# Patient Record
Sex: MALE | Race: WHITE | NOT HISPANIC OR LATINO | Employment: FULL TIME | URBAN - METROPOLITAN AREA
[De-identification: names, ages, dates, MRNs, and addresses within clinical notes are randomized per-mention and may not be internally consistent; named-entity substitution may affect disease eponyms.]

---

## 2017-06-26 ENCOUNTER — HOSPITAL ENCOUNTER (EMERGENCY)
Facility: HOSPITAL | Age: 42
Discharge: HOME | End: 2017-06-26
Attending: EMERGENCY MEDICINE | Admitting: EMERGENCY MEDICINE

## 2017-06-26 ENCOUNTER — APPOINTMENT (OUTPATIENT)
Dept: GENERAL RADIOLOGY | Facility: HOSPITAL | Age: 42
End: 2017-06-26

## 2017-06-26 VITALS
RESPIRATION RATE: 18 BRPM | HEART RATE: 90 BPM | WEIGHT: 215 LBS | SYSTOLIC BLOOD PRESSURE: 109 MMHG | OXYGEN SATURATION: 100 % | HEIGHT: 72 IN | TEMPERATURE: 98.4 F | BODY MASS INDEX: 29.12 KG/M2 | DIASTOLIC BLOOD PRESSURE: 83 MMHG

## 2017-06-26 DIAGNOSIS — S82.892A CLOSED LEFT ANKLE FRACTURE, INITIAL ENCOUNTER: Primary | ICD-10-CM

## 2017-06-26 PROCEDURE — 99284 EMERGENCY DEPT VISIT MOD MDM: CPT | Performed by: EMERGENCY MEDICINE

## 2017-06-26 PROCEDURE — 73610 X-RAY EXAM OF ANKLE: CPT

## 2017-06-26 PROCEDURE — 99283 EMERGENCY DEPT VISIT LOW MDM: CPT

## 2017-06-26 RX ORDER — IBUPROFEN 800 MG/1
TABLET ORAL
Qty: 30 TABLET | Refills: 0 | Status: SHIPPED | OUTPATIENT
Start: 2017-06-26 | End: 2017-08-10 | Stop reason: SDUPTHER

## 2017-06-26 RX ORDER — CITALOPRAM 10 MG/1
10 TABLET ORAL DAILY
COMMUNITY
End: 2017-10-12

## 2017-06-26 RX ORDER — HYDROCODONE BITARTRATE AND ACETAMINOPHEN 5; 325 MG/1; MG/1
TABLET ORAL
Qty: 20 TABLET | Refills: 0 | Status: SHIPPED | OUTPATIENT
Start: 2017-06-26 | End: 2017-08-10

## 2017-06-27 NOTE — DISCHARGE INSTRUCTIONS
No weightbearing on left foot until released by orthopedics    Return to the emergency department with worsening symptoms, uncontrolled pain, inability to tolerate oral liquids, fever greater than 105°F not controlled by Tylenol and ibuprofen or as needed with emergent concerns.

## 2017-06-27 NOTE — ED PROVIDER NOTES
Subjective   History of Present Illness     History of Present Illness    Chief complaint: Left ankle pain    Location: Just posterior to the medial malleolus    Quality/Severity:  Mild-to-moderate    Timing/Duration: Abrupt onset shortly prior to arrival    Modifying Factors: Worse with ambulation and range of motion    Associated Symptoms: No numbness or weakness    Narrative: 42-year-old male was at karate when he landed awkwardly and felt sudden discomfort in his left ankle.  He reports hearing a pop.  He had severe pain initially which is slightly improved.  There is some discomfort with walking.  No ecchymosis, or numbness noted.    Review of Systems  All other systems reviewed and are otherwise negative  History reviewed. No pertinent past medical history.    Allergies   Allergen Reactions   • Tessalon [Benzonatate] Anaphylaxis       Past Surgical History:   Procedure Laterality Date   • APPENDECTOMY     • CHOLECYSTECTOMY         History reviewed. No pertinent family history.    Social History     Social History   • Marital status:      Spouse name: N/A   • Number of children: N/A   • Years of education: N/A     Social History Main Topics   • Smoking status: Never Smoker   • Smokeless tobacco: None   • Alcohol use No   • Drug use: None   • Sexual activity: Not Asked     Other Topics Concern   • None     Social History Narrative   • None       ED Triage Vitals   Temp Heart Rate Resp BP SpO2   06/26/17 2149 06/26/17 2149 06/26/17 2149 06/26/17 2149 06/26/17 2149   98.4 °F (36.9 °C) 90 18 109/83 100 %      Temp src Heart Rate Source Patient Position BP Location FiO2 (%)   06/26/17 2149 -- 06/26/17 2149 06/26/17 2149 --   Oral  Sitting Right arm          Objective   Physical Exam   Constitutional: He is oriented to person, place, and time. He appears well-developed. No distress.   Cardiovascular: Normal rate and intact distal pulses.    Musculoskeletal:        Legs:       Neurological: He is alert and  oriented to person, place, and time.   Skin: Skin is warm and dry.   Psychiatric: He has a normal mood and affect. Thought content normal.   Vitals reviewed.      Procedures         ED Course  ED Course   Comment By Time   Patient was offered pain medication in the ED but declined.  Outpatient follow-up recommended.  Patient is agreeable. Alexi Nino MD 06/26 2209        RADIOLOGY        Study: Left ankle-3 views    Findings: Nondisplaced fracture distal tibia posterior element    Interpreted contemporaneously with treatment by me, independently viewed by me      SPLINT    Location: Right foot/ankle  Type: Orthopedic boot  Applied by tech  Following splinting, I have reexamined the extremity and noted no significant neurovascular change.            MDM  Saleem query complete. Treatment plan to include limited course of prescribed controlled substance.  Risks including addiction, tolerance, sedation, benefits and alternatives presented to patient.  Final diagnoses:   Closed left ankle fracture, initial encounter              Medication List      New Prescriptions          HYDROcodone-acetaminophen 5-325 MG per tablet   Commonly known as:  NORCO   Take 1-2 tabs by mouth every 4-6 hours as needed for pain       ibuprofen 800 MG tablet   Commonly known as:  ADVIL,MOTRIN   Take one tablet by mouth every 8 hours as needed for pain           Follow-up Information     Schedule an appointment as soon as possible for a visit with Zaid Perkins MD.    Specialties:  Orthopedic Surgery, Sports Medicine    Why:  Right ankle fracture    Contact information:    [address] [id number] [phone]                 Alexi Nino MD  06/26/17 2206

## 2017-07-05 ENCOUNTER — OFFICE VISIT (OUTPATIENT)
Dept: ORTHOPEDIC SURGERY | Facility: CLINIC | Age: 42
End: 2017-07-05

## 2017-07-05 VITALS
DIASTOLIC BLOOD PRESSURE: 83 MMHG | HEART RATE: 85 BPM | SYSTOLIC BLOOD PRESSURE: 123 MMHG | BODY MASS INDEX: 29.8 KG/M2 | WEIGHT: 220 LBS | HEIGHT: 72 IN

## 2017-07-05 DIAGNOSIS — R52 PAIN: Primary | ICD-10-CM

## 2017-07-05 DIAGNOSIS — S82.55XA CLOSED NONDISPLACED FRACTURE OF MEDIAL MALLEOLUS OF LEFT TIBIA, INITIAL ENCOUNTER: ICD-10-CM

## 2017-07-05 PROCEDURE — 73610 X-RAY EXAM OF ANKLE: CPT | Performed by: ORTHOPAEDIC SURGERY

## 2017-07-05 PROCEDURE — 99203 OFFICE O/P NEW LOW 30 MIN: CPT | Performed by: ORTHOPAEDIC SURGERY

## 2017-07-05 NOTE — PROGRESS NOTES
Subjective: Left ankle pain     Patient ID: Manuel Bellamy is a 42 y.o. male.    Chief Complaint:    History of Present Illness 42-year-old male was injured back in June 26 in a karate class or participating an exercise with not to the ground but his left ankle was  hyperflexed , developing immediate pain and discomfort.  Was seen in the ER Gallo Armendariz her x-rays demonstrated a nondisplaced posterior malleolar fracture.  He is placed in a postop boot and presents here.       Social History     Occupational History   • Not on file.     Social History Main Topics   • Smoking status: Never Smoker   • Smokeless tobacco: Not on file   • Alcohol use No   • Drug use: Not on file   • Sexual activity: Not on file      Review of Systems   Constitutional: Negative for chills, diaphoresis, fever and unexpected weight change.   HENT: Negative for hearing loss, nosebleeds, sore throat and tinnitus.    Eyes: Negative for pain and visual disturbance.   Respiratory: Negative for cough, shortness of breath and wheezing.    Cardiovascular: Negative for chest pain and palpitations.   Gastrointestinal: Negative for abdominal pain, diarrhea, nausea and vomiting.   Endocrine: Negative for cold intolerance, heat intolerance and polydipsia.   Genitourinary: Negative for difficulty urinating, dysuria and hematuria.   Musculoskeletal: Positive for arthralgias. Negative for joint swelling and myalgias.   Skin: Negative for rash and wound.   Allergic/Immunologic: Negative for environmental allergies.   Neurological: Negative for dizziness, syncope and numbness.   Hematological: Does not bruise/bleed easily.   Psychiatric/Behavioral: Negative for dysphoric mood and sleep disturbance. The patient is not nervous/anxious.    All other systems reviewed and are negative.        No past medical history on file.  Past Surgical History:   Procedure Laterality Date   • APPENDECTOMY     • CHOLECYSTECTOMY       No family history on  file.      Objective:  Vitals:    07/05/17 1104   BP: 123/83   Pulse: 85     Last 3 weights    07/05/17  1104   Weight: 220 lb (99.8 kg)     Body mass index is 29.84 kg/(m^2).       Ortho Exam  I reviewed the x-rays done at [facility] and repeat AP lateral oblique views here all which show the nondisplaced posterior malleolar fracture.  On exam is alert and oriented ×3.  Head ears eyes nose and throat are normocephalic.  Pupils equal round reactive to light sclerae clear.  Left leg shows no motor deficit good distal pulses.  No sensory loss.  The skin is cool to touch.  His calf is nontender negative Homans.  There is tenderness to the ankle both medially and laterally but there is minimal swelling no erythema.  He has been ambulating nonweightbearing with the crutches.  He has taken Motrin in the past with no GI side effects but not currently taking anything at this time.  Quad function of that left leg is 5 over 5.  Lower leg muscle testing is weak only secondary to pain.  He can dorsiflex to neutral position past which it is uncomfortable.  His Achilles tendon is intact to palpation.    Assessment:       1. Pain    2. Closed nondisplaced fracture of medial malleolus of left tibia, initial encounter          Plan:      I reviewed the x-rays both at the hospital here with the patient.  We'll keep him in his postop fracture boot.  He is to remain strictly nonweightbearing but he can remove it for range of motion exercises.  I instructed to restart his mobility previously took for another problem.  Return to see me in 3 weeks with an x-ray of his ankle.  Off work until July 12 in returning to work for the patient requests.      Work Status:    SANGITA query complete.    Orders:  Orders Placed This Encounter   Procedures   • XR Ankle 3+ View Left       Medications:  No orders of the defined types were placed in this encounter.      Followup:  Return in about 3 weeks (around 7/26/2017).          Dragon  transcription disclaimer     Much of this encounter note is an electronic transcription/translation of spoken language to printed text. The electronic translation of spoken language may permit erroneous, or at times, nonsensical words or phrases to be inadvertently transcribed. Although I have reviewed the note for such errors, some may still exist.

## 2017-07-20 ENCOUNTER — OFFICE VISIT (OUTPATIENT)
Dept: ORTHOPEDIC SURGERY | Facility: CLINIC | Age: 42
End: 2017-07-20

## 2017-07-20 DIAGNOSIS — S82.55XA CLOSED NONDISPLACED FRACTURE OF MEDIAL MALLEOLUS OF LEFT TIBIA, INITIAL ENCOUNTER: ICD-10-CM

## 2017-07-20 DIAGNOSIS — R52 PAIN: Primary | ICD-10-CM

## 2017-07-20 PROCEDURE — 73610 X-RAY EXAM OF ANKLE: CPT | Performed by: ORTHOPAEDIC SURGERY

## 2017-07-20 PROCEDURE — 99024 POSTOP FOLLOW-UP VISIT: CPT | Performed by: ORTHOPAEDIC SURGERY

## 2017-07-20 RX ORDER — ASPIRIN 81 MG/1
81 TABLET ORAL DAILY
COMMUNITY
End: 2018-05-18

## 2017-07-20 NOTE — PROGRESS NOTES
Subjective: Left ankle pain     Patient ID: Manuel Bellamy is a 42 y.o. male.    Chief Complaint:    History of Present Illness 42-year-old male is 4 weeks out from a nondisplaced posterior malleolar fracture of the left ankle.  He has been nonweightbearing expensive no pain in the ankle.  There is some soreness in his midfoot.       Social History     Occupational History   • Not on file.     Social History Main Topics   • Smoking status: Never Smoker   • Smokeless tobacco: Not on file   • Alcohol use No   • Drug use: Not on file   • Sexual activity: Not on file      Review of Systems   Constitutional: Negative for chills, diaphoresis, fever and unexpected weight change.   HENT: Negative for hearing loss, nosebleeds, sore throat and tinnitus.    Eyes: Negative for pain and visual disturbance.   Respiratory: Negative for cough, shortness of breath and wheezing.    Cardiovascular: Negative for chest pain and palpitations.   Gastrointestinal: Negative for abdominal pain, diarrhea, nausea and vomiting.   Endocrine: Negative for cold intolerance, heat intolerance and polydipsia.   Genitourinary: Negative for difficulty urinating, dysuria and hematuria.   Musculoskeletal: Negative for arthralgias, joint swelling and myalgias.   Skin: Negative for rash and wound.   Allergic/Immunologic: Negative for environmental allergies.   Neurological: Negative for dizziness, syncope and numbness.   Hematological: Does not bruise/bleed easily.   Psychiatric/Behavioral: Negative for dysphoric mood and sleep disturbance. The patient is not nervous/anxious.          No past medical history on file.  Past Surgical History:   Procedure Laterality Date   • APPENDECTOMY     • CHOLECYSTECTOMY       No family history on file.      Objective:  There were no vitals filed for this visit.  There were no vitals filed for this visit.  There is no height or weight on file to calculate BMI.       Ortho Exam  AP lateral oblique view of the ankle and  foot shows a nondisplaced posterior malleolar fragment.  The fragment involves less than 20% of the tibia and is in the nonweightbearing portion of the tibial talar joint.  X-rayed the foot is negative for any fracture.  Compared to previous x-rays no change in the position of the posterior malleolar fragment.  On exam is alert and oriented ×3.  This minimal swelling noted.  No sensory loss.  Skin is cool to touch.  No motor deficit good distal pulses.  He can plantarflex the foot against resistance with no pain.  Mild tenderness to palpation over the metatarsal bases but there is no crepitus.  No ecchymosis.    Assessment:       1. Pain    2. Closed nondisplaced fracture of medial malleolus of left tibia, initial encounter          Plan:     reviewed the x-ray findings with the patient and the physical findings.  I believe that midfoot tenderness is ligament for soft tissue injury that occurred at the time he hurt the posterior malleolus.  He can begin though weightbearing as tolerated and that was emphasized as tolerated in the fracture boot.  Return to see me in 3 weeks with an x-ray of the ankle.  Continue the baby aspirin a daily basis till seen.  Continue sitdown work only until seen.      Work Status:    SANGITA query complete.    Orders:  Orders Placed This Encounter   Procedures   • XR Ankle 3+ View Left       Medications:  New Medications Ordered This Visit   Medications   • aspirin 81 MG EC tablet     Sig: Take 81 mg by mouth Daily.       Followup:  Return in about 3 weeks (around 8/10/2017).          Dragon transcription disclaimer     Much of this encounter note is an electronic transcription/translation of spoken language to printed text. The electronic translation of spoken language may permit erroneous, or at times, nonsensical words or phrases to be inadvertently transcribed. Although I have reviewed the note for such errors, some may still exist.

## 2017-08-10 ENCOUNTER — OFFICE VISIT (OUTPATIENT)
Dept: ORTHOPEDIC SURGERY | Facility: CLINIC | Age: 42
End: 2017-08-10

## 2017-08-10 VITALS — HEIGHT: 72 IN | WEIGHT: 220 LBS | BODY MASS INDEX: 29.8 KG/M2

## 2017-08-10 DIAGNOSIS — S82.55XA CLOSED NONDISPLACED FRACTURE OF MEDIAL MALLEOLUS OF LEFT TIBIA, INITIAL ENCOUNTER: ICD-10-CM

## 2017-08-10 DIAGNOSIS — R52 PAIN: Primary | ICD-10-CM

## 2017-08-10 PROCEDURE — 73610 X-RAY EXAM OF ANKLE: CPT | Performed by: ORTHOPAEDIC SURGERY

## 2017-08-10 PROCEDURE — 99024 POSTOP FOLLOW-UP VISIT: CPT | Performed by: ORTHOPAEDIC SURGERY

## 2017-08-10 RX ORDER — IBUPROFEN 800 MG/1
TABLET ORAL
Qty: 90 TABLET | Refills: 5 | Status: SHIPPED | OUTPATIENT
Start: 2017-08-10 | End: 2017-08-24

## 2017-08-10 RX ORDER — IBUPROFEN 800 MG/1
TABLET ORAL
Qty: 90 TABLET | Refills: 5 | Status: SHIPPED | OUTPATIENT
Start: 2017-08-10 | End: 2017-08-10 | Stop reason: SDUPTHER

## 2017-08-10 NOTE — PROGRESS NOTES
Subjective: Left posterior malleolar fracture     Patient ID: Manuel Bellamy is a 42 y.o. male.    Chief Complaint:    History of Present Illness 42-year-old male is ambulating in the fracture boot with minimal to no pain.  He is now 6 weeks out doing quite well.         Social History     Occupational History   • Not on file.     Social History Main Topics   • Smoking status: Never Smoker   • Smokeless tobacco: Never Used   • Alcohol use No   • Drug use: No   • Sexual activity: Defer      Review of Systems   Constitutional: Negative for chills, diaphoresis, fever and unexpected weight change.   HENT: Negative for hearing loss, nosebleeds, sore throat and tinnitus.    Eyes: Negative for pain and visual disturbance.   Respiratory: Negative for cough, shortness of breath and wheezing.    Cardiovascular: Negative for chest pain and palpitations.   Gastrointestinal: Negative for abdominal pain, diarrhea, nausea and vomiting.   Endocrine: Negative for cold intolerance, heat intolerance and polydipsia.   Genitourinary: Negative for difficulty urinating, dysuria and hematuria.   Musculoskeletal: Positive for arthralgias and myalgias. Negative for joint swelling.   Skin: Negative for rash and wound.   Allergic/Immunologic: Negative for environmental allergies.   Neurological: Negative for dizziness, syncope and numbness.   Hematological: Does not bruise/bleed easily.   Psychiatric/Behavioral: Negative for dysphoric mood and sleep disturbance. The patient is not nervous/anxious.          History reviewed. No pertinent past medical history.  Past Surgical History:   Procedure Laterality Date   • APPENDECTOMY     • CHOLECYSTECTOMY       History reviewed. No pertinent family history.      Objective:  There were no vitals filed for this visit.  Last 3 weights    08/10/17  1329   Weight: 220 lb (99.8 kg)     Body mass index is 29.84 kg/(m^2).       Ortho Exam  AP lateral oblique view of the ankle to evaluate healing show the  fracture to be nondisplaced.  The fracture line is still visible but less so compared to previous x-rays.  On exam there is minimal swelling no erythema.  No motor deficit good distal pulses.  No sensory loss in the skin is cool to touch.      Assessment:       1. Pain    2. Closed nondisplaced fracture of medial malleolus of left tibia, initial encounter          Plan:      We'll convert to an active ankle brace.  Continue weightbearing as tolerated.  Return in 3 weeks.  Repeat x-ray on return.  Sitdown work only until seen.  Continue the ibuprofen 800 3 times a day.        Work Status:    SANGITA query complete.    Orders:  Orders Placed This Encounter   Procedures   • XR Ankle 3+ View Left       Medications:  New Medications Ordered This Visit   Medications   • ibuprofen (ADVIL,MOTRIN) 800 MG tablet     Sig: Take one tablet by mouth every 8 hours as needed for pain     Dispense:  90 tablet     Refill:  5       Followup:  Return in about 3 weeks (around 8/31/2017).          Dragon transcription disclaimer     Much of this encounter note is an electronic transcription/translation of spoken language to printed text. The electronic translation of spoken language may permit erroneous, or at times, nonsensical words or phrases to be inadvertently transcribed. Although I have reviewed the note for such errors, some may still exist.

## 2017-08-24 ENCOUNTER — OFFICE VISIT (OUTPATIENT)
Dept: ORTHOPEDIC SURGERY | Facility: CLINIC | Age: 42
End: 2017-08-24

## 2017-08-24 DIAGNOSIS — S82.55XA CLOSED NONDISPLACED FRACTURE OF MEDIAL MALLEOLUS OF LEFT TIBIA, INITIAL ENCOUNTER: ICD-10-CM

## 2017-08-24 DIAGNOSIS — R52 PAIN: Primary | ICD-10-CM

## 2017-08-24 PROCEDURE — 73610 X-RAY EXAM OF ANKLE: CPT | Performed by: ORTHOPAEDIC SURGERY

## 2017-08-24 PROCEDURE — 99024 POSTOP FOLLOW-UP VISIT: CPT | Performed by: ORTHOPAEDIC SURGERY

## 2017-08-24 RX ORDER — MELOXICAM 15 MG/1
15 TABLET ORAL DAILY
Qty: 30 TABLET | Refills: 5 | Status: SHIPPED | OUTPATIENT
Start: 2017-08-24 | End: 2018-05-18

## 2017-08-24 NOTE — PROGRESS NOTES
Subjective: Left posterior tibial malleolar fracture     Patient ID: Manuel Bellamy is a 42 y.o. male.    Chief Complaint:    History of Present Illness patient is 2 months out the above-stated injury is doing well.  Having no posterior heel pain more anterior ankle pain.  Is ambulating independently with his active ankle brace.       Social History     Occupational History   • Not on file.     Social History Main Topics   • Smoking status: Never Smoker   • Smokeless tobacco: Never Used   • Alcohol use No   • Drug use: No   • Sexual activity: Defer      Review of Systems   Constitutional: Negative for chills, diaphoresis, fever and unexpected weight change.   HENT: Negative for hearing loss, nosebleeds, sore throat and tinnitus.    Eyes: Negative for pain and visual disturbance.   Respiratory: Negative for cough, shortness of breath and wheezing.    Cardiovascular: Negative for chest pain and palpitations.   Gastrointestinal: Negative for abdominal pain, diarrhea, nausea and vomiting.   Endocrine: Negative for cold intolerance, heat intolerance and polydipsia.   Genitourinary: Negative for difficulty urinating, dysuria and hematuria.   Musculoskeletal: Negative for arthralgias, joint swelling and myalgias.   Skin: Negative for rash and wound.   Allergic/Immunologic: Negative for environmental allergies.   Neurological: Negative for dizziness, syncope and numbness.   Hematological: Does not bruise/bleed easily.   Psychiatric/Behavioral: Negative for dysphoric mood and sleep disturbance. The patient is not nervous/anxious.          No past medical history on file.  Past Surgical History:   Procedure Laterality Date   • APPENDECTOMY     • CHOLECYSTECTOMY       No family history on file.      Objective:  There were no vitals filed for this visit.  There were no vitals filed for this visit.  There is no height or weight on file to calculate BMI.       Ortho Exam  AP lateral oblique view of the left ankle to evaluate  the fracture showed complete healing of the fracture.  Compared to previous x-rays fracture line is not visible.  On exam is alert and oriented ×3.  The ankle shows no swelling effusion erythema but he has anterior tibial talar joint discomfort and midfoot discomfort.  His no motor deficit good distal pulses.  No sensory loss.  The skin is cool to touch.    Assessment:       1. Pain    2. Closed nondisplaced fracture of medial malleolus of left tibia, initial encounter          Plan:      even taken subtherapeutic dosage of ibuprofen to begin meloxicam 15 mg daily.  He can DC the active ankle brace as tolerated.  At his request release return to work on Monday with no restrictions.  Return to see me in 3 weeks no x-rays necessary.      Work Status:    SANGITA query complete.    Orders:  Orders Placed This Encounter   Procedures   • XR Ankle 3+ View Left       Medications:  New Medications Ordered This Visit   Medications   • meloxicam (MOBIC) 15 MG tablet     Sig: Take 1 tablet by mouth Daily.     Dispense:  30 tablet     Refill:  5       Followup:  Return in about 3 weeks (around 9/14/2017).          Dragon transcription disclaimer     Much of this encounter note is an electronic transcription/translation of spoken language to printed text. The electronic translation of spoken language may permit erroneous, or at times, nonsensical words or phrases to be inadvertently transcribed. Although I have reviewed the note for such errors, some may still exist.

## 2017-09-14 ENCOUNTER — OFFICE VISIT (OUTPATIENT)
Dept: ORTHOPEDIC SURGERY | Facility: CLINIC | Age: 42
End: 2017-09-14

## 2017-09-14 VITALS — BODY MASS INDEX: 29.8 KG/M2 | WEIGHT: 220 LBS | HEIGHT: 72 IN

## 2017-09-14 DIAGNOSIS — S82.55XA CLOSED NONDISPLACED FRACTURE OF MEDIAL MALLEOLUS OF LEFT TIBIA, INITIAL ENCOUNTER: Primary | ICD-10-CM

## 2017-09-14 DIAGNOSIS — R52 PAIN: ICD-10-CM

## 2017-09-14 PROCEDURE — 99024 POSTOP FOLLOW-UP VISIT: CPT | Performed by: ORTHOPAEDIC SURGERY

## 2017-09-14 NOTE — PROGRESS NOTES
Subjective: Nondisplaced fracture medial malleolus left ankle     Patient ID: Manuel Bellamy is a 42 y.o. male.    Chief Complaint:    History of Present Illness patient is 10 weeks out is doing well with no pain and discomfort in the ankle is ambulating independently freely without discomfort in his shoes.  Some discomfort with attempts to jog but otherwise is completely asymptomatic.       Social History     Occupational History   • Not on file.     Social History Main Topics   • Smoking status: Never Smoker   • Smokeless tobacco: Never Used   • Alcohol use No   • Drug use: No   • Sexual activity: Defer      Review of Systems   Constitutional: Negative for chills, diaphoresis, fever and unexpected weight change.   HENT: Negative for hearing loss, nosebleeds, sore throat and tinnitus.    Eyes: Negative for pain and visual disturbance.   Respiratory: Negative for cough, shortness of breath and wheezing.    Cardiovascular: Negative for chest pain and palpitations.   Gastrointestinal: Negative for abdominal pain, diarrhea, nausea and vomiting.   Endocrine: Negative for cold intolerance, heat intolerance and polydipsia.   Genitourinary: Negative for difficulty urinating, dysuria and hematuria.   Musculoskeletal: Positive for arthralgias and myalgias. Negative for joint swelling.   Skin: Negative for rash and wound.   Allergic/Immunologic: Negative for environmental allergies.   Neurological: Negative for dizziness, syncope and numbness.   Hematological: Does not bruise/bleed easily.   Psychiatric/Behavioral: Negative for dysphoric mood and sleep disturbance. The patient is not nervous/anxious.          History reviewed. No pertinent past medical history.  Past Surgical History:   Procedure Laterality Date   • APPENDECTOMY     • CHOLECYSTECTOMY       History reviewed. No pertinent family history.      Objective:  There were no vitals filed for this visit.  Last 3 weights    09/14/17  1526   Weight: 220 lb (99.8 kg)      Body mass index is 29.84 kg/(m^2).       Ortho Exam  is alert and oriented ×3.  Left ankle shows no swelling effusion erythema.  His no tenderness over the medial malleolus.  Some slight tenderness over the anterior talotibial joint but otherwise is no instability or pain and discomfort.    Assessment:       1. Closed nondisplaced fracture of medial malleolus of left tibia, initial encounter    2. Pain          Plan:      advised continue the meloxicam until pain-free.  Return to see me on an as-needed basis.  Was instructed if by the end of the year he still cannot drive because of pain or if he should some reason of sudden increased the pain is to be seen otherwise again when necessary      Work Status:    SANGITA query complete.    Orders:  No orders of the defined types were placed in this encounter.      Medications:  No orders of the defined types were placed in this encounter.      Followup:  Return if symptoms worsen or fail to improve.          Dragon transcription disclaimer     Much of this encounter note is an electronic transcription/translation of spoken language to printed text. The electronic translation of spoken language may permit erroneous, or at times, nonsensical words or phrases to be inadvertently transcribed. Although I have reviewed the note for such errors, some may still exist.

## 2017-10-12 ENCOUNTER — OFFICE VISIT (OUTPATIENT)
Dept: INTERNAL MEDICINE | Facility: CLINIC | Age: 42
End: 2017-10-12

## 2017-10-12 VITALS
HEART RATE: 77 BPM | DIASTOLIC BLOOD PRESSURE: 78 MMHG | HEIGHT: 72 IN | BODY MASS INDEX: 30.1 KG/M2 | SYSTOLIC BLOOD PRESSURE: 122 MMHG | OXYGEN SATURATION: 98 % | WEIGHT: 222.2 LBS

## 2017-10-12 DIAGNOSIS — Z13.1 SCREENING FOR DIABETES MELLITUS: ICD-10-CM

## 2017-10-12 DIAGNOSIS — F32.A DEPRESSION, UNSPECIFIED DEPRESSION TYPE: ICD-10-CM

## 2017-10-12 DIAGNOSIS — J30.2 CHRONIC SEASONAL ALLERGIC RHINITIS, UNSPECIFIED TRIGGER: Primary | ICD-10-CM

## 2017-10-12 DIAGNOSIS — F51.04 PSYCHOPHYSIOLOGICAL INSOMNIA: ICD-10-CM

## 2017-10-12 DIAGNOSIS — Z13.29 SCREENING FOR HYPOTHYROIDISM: ICD-10-CM

## 2017-10-12 DIAGNOSIS — Z13.220 SCREENING FOR HYPERLIPIDEMIA: ICD-10-CM

## 2017-10-12 DIAGNOSIS — E55.9 VITAMIN D DEFICIENCY: ICD-10-CM

## 2017-10-12 DIAGNOSIS — F41.9 ANXIETY: ICD-10-CM

## 2017-10-12 DIAGNOSIS — H61.23 EXCESSIVE CERUMEN IN BOTH EAR CANALS: ICD-10-CM

## 2017-10-12 PROCEDURE — 99214 OFFICE O/P EST MOD 30 MIN: CPT | Performed by: INTERNAL MEDICINE

## 2017-10-12 PROCEDURE — 90471 IMMUNIZATION ADMIN: CPT | Performed by: INTERNAL MEDICINE

## 2017-10-12 PROCEDURE — 90686 IIV4 VACC NO PRSV 0.5 ML IM: CPT | Performed by: INTERNAL MEDICINE

## 2017-10-12 RX ORDER — CITALOPRAM 20 MG/1
10 TABLET ORAL DAILY
COMMUNITY
End: 2017-10-12 | Stop reason: SDUPTHER

## 2017-10-12 RX ORDER — CITALOPRAM 20 MG/1
10 TABLET ORAL DAILY
Qty: 30 TABLET | Refills: 3 | Status: SHIPPED | OUTPATIENT
Start: 2017-10-12 | End: 2019-01-18 | Stop reason: SDUPTHER

## 2017-10-12 RX ORDER — LORATADINE 10 MG/1
CAPSULE, LIQUID FILLED ORAL
COMMUNITY
End: 2018-06-18

## 2017-10-12 RX ORDER — CHOLECALCIFEROL (VITAMIN D3) 125 MCG
10 CAPSULE ORAL
COMMUNITY

## 2017-10-12 RX ORDER — CHOLECALCIFEROL (VITAMIN D3) 25 MCG
CAPSULE ORAL
COMMUNITY
End: 2018-05-18

## 2017-10-12 NOTE — PROGRESS NOTES
Subjective     Manuel Bellamy is a 42 y.o. male, who presents with a chief complaint of   Chief Complaint   Patient presents with   • Establish Care     former Dr. Ibarra pt, med refill rx citalopram     • Earache     bilateral        HPI Comments: 41 yo M here to establish care and all of his problems are new to me. He was a previous patient of Dr. Delgado.     He has had ear fullness for close to 3 weeks as well as pain. No drainage. No fever. He has used OTC drops that have not helped. He does have a history of getting cerumen impactions. He does have very bad allergies and takes flonase daily. Loratadine daily as well which has helped him a lot.     He has had chronic depression and poor sleep after gallbladder surgery. He states that he feels good on the medication and takes 10mg per day. He takes Melatonin nightly for his sleep as well. He sees himself as a worrier.          The following portions of the patient's history were reviewed and updated as appropriate: allergies, current medications, past family history, past medical history, past social history, past surgical history and problem list.    Allergies: Tessalon [benzonatate]    Current Outpatient Prescriptions:   •  aspirin 81 MG EC tablet, Take 81 mg by mouth Daily., Disp: , Rfl:   •  Cholecalciferol (VITAMIN D-3) 1000 units capsule, Take  by mouth., Disp: , Rfl:   •  citalopram (CeleXA) 20 MG tablet, Take 0.5 tablets by mouth Daily., Disp: 30 tablet, Rfl: 3  •  fluticasone (VERAMYST) 27.5 MCG/SPRAY nasal spray, 2 sprays into each nostril Daily., Disp: , Rfl:   •  Loratadine 10 MG capsule, Take  by mouth., Disp: , Rfl:   •  melatonin 5 MG tablet tablet, Take 5 mg by mouth., Disp: , Rfl:   •  meloxicam (MOBIC) 15 MG tablet, Take 1 tablet by mouth Daily., Disp: 30 tablet, Rfl: 5      Review of Systems   Constitutional: Negative for chills and fever.   HENT: Positive for ear discharge (and fulllness). Negative for congestion and rhinorrhea.    Respiratory:  "Negative for cough and shortness of breath.    Hematological: Negative for adenopathy.   Psychiatric/Behavioral: Negative for agitation.       Objective     /78 (BP Location: Left arm, Patient Position: Sitting, Cuff Size: Adult)  Pulse 77  Ht 72\" (182.9 cm)  Wt 222 lb 3.2 oz (101 kg)  SpO2 98%  BMI 30.14 kg/m2      Physical Exam   Constitutional: He is oriented to person, place, and time. He appears well-developed and well-nourished. No distress.   HENT:   Head: Normocephalic and atraumatic.   Right Ear: Hearing and external ear normal.   Left Ear: Hearing and external ear normal.   Mouth/Throat: Oropharynx is clear and moist. No oropharyngeal exudate.   Cerumen impaction bilaterally. Better after irrigation,but has small ear canals that are tilted up and backwards. Still has cerumen obstructing his TM afterwards.    Eyes: Conjunctivae are normal. Right eye exhibits no discharge. Left eye exhibits no discharge. No scleral icterus.   Neck: Neck supple.   Cardiovascular: Normal rate, regular rhythm and normal heart sounds.  Exam reveals no gallop and no friction rub.    No murmur heard.  Pulmonary/Chest: Effort normal and breath sounds normal. No respiratory distress. He has no wheezes. He has no rales.   Abdominal: Soft. Bowel sounds are normal. He exhibits no distension and no mass. There is no tenderness. There is no guarding.   Lymphadenopathy:     He has no cervical adenopathy.   Neurological: He is alert and oriented to person, place, and time.   Skin: Skin is warm. No rash noted.   Psychiatric: He has a normal mood and affect. His behavior is normal.   Nursing note and vitals reviewed.          No results found for this or any previous visit.    Assessment/Plan   Manuel was seen today for establish care and earache.    Diagnoses and all orders for this visit:    Chronic seasonal allergic rhinitis, unspecified trigger    Vitamin D deficiency  -     Vitamin D 25 Hydroxy    Depression, unspecified " depression type  -     CBC & Differential  -     Urinalysis With / Culture If Indicated - Urine, Clean Catch    Psychophysiological insomnia    Anxiety  -     CBC & Differential    Screening for diabetes mellitus  -     Comprehensive Metabolic Panel    Screening for hyperlipidemia  -     Lipid Panel With LDL / HDL Ratio    Screening for hypothyroidism  -     TSH Rfx On Abnormal To Free T4    Excessive cerumen in both ear canals    Other orders  -     Flu Vaccine Quad PF 3YR+  -     citalopram (CeleXA) 20 MG tablet; Take 0.5 tablets by mouth Daily.    Continue Claritin and flonase for allergies    Recheck vitamin D with labs and follow up at physical. Continue current dose for now    Depression and anxiety and insomnia are stable currently, but he would like to wean off at some point. Discussed that we can do this and will do it over one month. He is low dose now.     Will check screening labs and discuss at physical     Will continue Debrox for his ears and will try to irrigate again at physical. He has very small ear canals and may need to see ENT in future.       Return in about 4 weeks (around 11/9/2017) for Annual physical.    Idalmis Whitehead MD  10/12/2017

## 2017-11-10 LAB
25(OH)D3+25(OH)D2 SERPL-MCNC: 26.4 NG/ML
ALBUMIN SERPL-MCNC: 4.5 G/DL (ref 3.5–5.2)
ALBUMIN/GLOB SERPL: 1.6 G/DL
ALP SERPL-CCNC: 77 U/L (ref 40–129)
ALT SERPL-CCNC: 23 U/L (ref 5–41)
APPEARANCE UR: CLEAR
AST SERPL-CCNC: 19 U/L (ref 5–40)
BACTERIA #/AREA URNS HPF: NORMAL /HPF
BASOPHILS # BLD AUTO: 0.04 10*3/MM3 (ref 0–0.2)
BASOPHILS NFR BLD AUTO: 0.6 % (ref 0–2)
BILIRUB SERPL-MCNC: 0.5 MG/DL (ref 0.2–1.2)
BILIRUB UR QL STRIP: NEGATIVE
BUN SERPL-MCNC: 16 MG/DL (ref 6–20)
BUN/CREAT SERPL: 18.6 (ref 7–25)
CALCIUM SERPL-MCNC: 9.6 MG/DL (ref 8.6–10.5)
CHLORIDE SERPL-SCNC: 99 MMOL/L (ref 98–107)
CHOLEST SERPL-MCNC: 224 MG/DL (ref 0–200)
CO2 SERPL-SCNC: 28.6 MMOL/L (ref 22–29)
COLOR UR: YELLOW
CREAT SERPL-MCNC: 0.86 MG/DL (ref 0.76–1.27)
EOSINOPHIL # BLD AUTO: 0.25 10*3/MM3 (ref 0.1–0.3)
EOSINOPHIL NFR BLD AUTO: 3.7 % (ref 0–4)
EPI CELLS #/AREA URNS HPF: NORMAL /HPF
ERYTHROCYTE [DISTWIDTH] IN BLOOD BY AUTOMATED COUNT: 12.1 % (ref 11.5–14.5)
GFR SERPLBLD CREATININE-BSD FMLA CKD-EPI: 118 ML/MIN/1.73
GFR SERPLBLD CREATININE-BSD FMLA CKD-EPI: 97 ML/MIN/1.73
GLOBULIN SER CALC-MCNC: 2.9 GM/DL
GLUCOSE SERPL-MCNC: 89 MG/DL (ref 65–99)
GLUCOSE UR QL: NEGATIVE
HCT VFR BLD AUTO: 44.5 % (ref 42–52)
HDLC SERPL-MCNC: 40 MG/DL (ref 40–60)
HGB BLD-MCNC: 15.7 G/DL (ref 14–18)
HGB UR QL STRIP: NEGATIVE
IMM GRANULOCYTES # BLD: 0.02 10*3/MM3 (ref 0–0.03)
IMM GRANULOCYTES NFR BLD: 0.3 % (ref 0–0.5)
KETONES UR QL STRIP: NEGATIVE
LDLC SERPL CALC-MCNC: 124 MG/DL (ref 0–100)
LDLC/HDLC SERPL: 3.1 {RATIO}
LEUKOCYTE ESTERASE UR QL STRIP: NEGATIVE
LYMPHOCYTES # BLD AUTO: 2.52 10*3/MM3 (ref 0.6–4.8)
LYMPHOCYTES NFR BLD AUTO: 37.4 % (ref 20–45)
MCH RBC QN AUTO: 31.9 PG (ref 27–31)
MCHC RBC AUTO-ENTMCNC: 35.3 G/DL (ref 31–37)
MCV RBC AUTO: 90.4 FL (ref 80–94)
MICRO URNS: NORMAL
MICRO URNS: NORMAL
MONOCYTES # BLD AUTO: 0.59 10*3/MM3 (ref 0–1)
MONOCYTES NFR BLD AUTO: 8.8 % (ref 3–8)
MUCOUS THREADS URNS QL MICRO: PRESENT /HPF
NEUTROPHILS # BLD AUTO: 3.32 10*3/MM3 (ref 1.5–8.3)
NEUTROPHILS NFR BLD AUTO: 49.2 % (ref 45–70)
NITRITE UR QL STRIP: NEGATIVE
NRBC BLD AUTO-RTO: 0 /100 WBC (ref 0–0)
PH UR STRIP: 5.5 [PH] (ref 5–7.5)
PLATELET # BLD AUTO: 254 10*3/MM3 (ref 140–500)
POTASSIUM SERPL-SCNC: 4.7 MMOL/L (ref 3.5–5.2)
PROT SERPL-MCNC: 7.4 G/DL (ref 6–8.5)
PROT UR QL STRIP: NEGATIVE
RBC # BLD AUTO: 4.92 10*6/MM3 (ref 4.7–6.1)
RBC #/AREA URNS HPF: NORMAL /HPF
SODIUM SERPL-SCNC: 137 MMOL/L (ref 136–145)
SP GR UR: 1.02 (ref 1–1.03)
TRIGL SERPL-MCNC: 300 MG/DL (ref 0–150)
TSH SERPL DL<=0.005 MIU/L-ACNC: 3.45 MIU/ML (ref 0.27–4.2)
URINALYSIS REFLEX: NORMAL
UROBILINOGEN UR STRIP-MCNC: 0.2 MG/DL (ref 0.2–1)
VLDLC SERPL CALC-MCNC: 60 MG/DL (ref 8–32)
WBC # BLD AUTO: 6.74 10*3/MM3 (ref 4.8–10.8)
WBC #/AREA URNS HPF: NORMAL /HPF

## 2017-11-16 ENCOUNTER — OFFICE VISIT (OUTPATIENT)
Dept: INTERNAL MEDICINE | Facility: CLINIC | Age: 42
End: 2017-11-16

## 2017-11-16 VITALS
DIASTOLIC BLOOD PRESSURE: 76 MMHG | BODY MASS INDEX: 30.2 KG/M2 | WEIGHT: 223 LBS | SYSTOLIC BLOOD PRESSURE: 128 MMHG | OXYGEN SATURATION: 98 % | HEART RATE: 85 BPM | HEIGHT: 72 IN

## 2017-11-16 DIAGNOSIS — E78.2 MIXED HYPERLIPIDEMIA: ICD-10-CM

## 2017-11-16 DIAGNOSIS — Z00.00 ENCOUNTER FOR ANNUAL PHYSICAL EXAM: Primary | ICD-10-CM

## 2017-11-16 DIAGNOSIS — G89.29 CHRONIC BILATERAL LOW BACK PAIN WITHOUT SCIATICA: ICD-10-CM

## 2017-11-16 DIAGNOSIS — I95.1 ORTHOSTATIC HYPOTENSION: ICD-10-CM

## 2017-11-16 DIAGNOSIS — H61.23 BILATERAL IMPACTED CERUMEN: ICD-10-CM

## 2017-11-16 DIAGNOSIS — M54.50 CHRONIC BILATERAL LOW BACK PAIN WITHOUT SCIATICA: ICD-10-CM

## 2017-11-16 PROCEDURE — 99213 OFFICE O/P EST LOW 20 MIN: CPT | Performed by: INTERNAL MEDICINE

## 2017-11-16 PROCEDURE — 93000 ELECTROCARDIOGRAM COMPLETE: CPT | Performed by: INTERNAL MEDICINE

## 2017-11-16 PROCEDURE — 99396 PREV VISIT EST AGE 40-64: CPT | Performed by: INTERNAL MEDICINE

## 2017-11-16 NOTE — PATIENT INSTRUCTIONS
"Food Choices to Lower Your Triglycerides  Triglycerides are a type of fat in your blood. High levels of triglycerides can increase the risk of heart disease and stroke. If your triglyceride levels are high, the foods you eat and your eating habits are very important. Choosing the right foods can help lower your triglycerides.   WHAT GENERAL GUIDELINES DO I NEED TO FOLLOW?  · Lose weight if you are overweight.    · Limit or avoid alcohol.    · Fill one half of your plate with vegetables and green salads.    · Limit fruit to two servings a day. Choose fruit instead of juice.    · Make one fourth of your plate whole grains. Look for the word \"whole\" as the first word in the ingredient list.  · Fill one fourth of your plate with lean protein foods.  · Enjoy fatty fish (such as salmon, mackerel, sardines, and tuna) three times a week.    · Choose healthy fats.    · Limit foods high in starch and sugar.  · Eat more home-cooked food and less restaurant, buffet, and fast food.  · Limit fried foods.  · Cook foods using methods other than frying.  · Limit saturated fats.  · Check ingredient lists to avoid foods with partially hydrogenated oils (trans fats) in them.  WHAT FOODS CAN I EAT?   Grains  Whole grains, such as whole wheat or whole grain breads, crackers, cereals, and pasta. Unsweetened oatmeal, bulgur, barley, quinoa, or brown rice. Corn or whole wheat flour tortillas.   Vegetables  Fresh or frozen vegetables (raw, steamed, roasted, or grilled). Green salads.  Fruits  All fresh, canned (in natural juice), or frozen fruits.  Meat and Other Protein Products  Ground beef (85% or leaner), grass-fed beef, or beef trimmed of fat. Skinless chicken or turkey. Ground chicken or turkey. Pork trimmed of fat. All fish and seafood. Eggs. Dried beans, peas, or lentils. Unsalted nuts or seeds. Unsalted canned or dry beans.  Dairy  Low-fat dairy products, such as skim or 1% milk, 2% or reduced-fat cheeses, low-fat ricotta or cottage " cheese, or plain low-fat yogurt.  Fats and Oils  Tub margarines without trans fats. Light or reduced-fat mayonnaise and salad dressings. Avocado. Safflower, olive, or canola oils. Natural peanut or almond butter.  The items listed above may not be a complete list of recommended foods or beverages. Contact your dietitian for more options.  WHAT FOODS ARE NOT RECOMMENDED?   Grains  White bread. White pasta. White rice. Cornbread. Bagels, pastries, and croissants. Crackers that contain trans fat.  Vegetables  White potatoes. Corn. Creamed or fried vegetables. Vegetables in a cheese sauce.  Fruits  Dried fruits. Canned fruit in light or heavy syrup. Fruit juice.  Meat and Other Protein Products  Fatty cuts of meat. Ribs, chicken wings, cobb, sausage, bologna, salami, chitterlings, fatback, hot dogs, bratwurst, and packaged luncheon meats.  Dairy  Whole or 2% milk, cream, half-and-half, and cream cheese. Whole-fat or sweetened yogurt. Full-fat cheeses. Nondairy creamers and whipped toppings. Processed cheese, cheese spreads, or cheese curds.  Sweets and Desserts  Corn syrup, sugars, honey, and molasses. Candy. Jam and jelly. Syrup. Sweetened cereals. Cookies, pies, cakes, donuts, muffins, and ice cream.  Fats and Oils  Butter, stick margarine, lard, shortening, ghee, or cobb fat. Coconut, palm kernel, or palm oils.  Beverages  Alcohol. Sweetened drinks (such as sodas, lemonade, and fruit drinks or punches).  The items listed above may not be a complete list of foods and beverages to avoid. Contact your dietitian for more information.     This information is not intended to replace advice given to you by your health care provider. Make sure you discuss any questions you have with your health care provider.     Document Released: 10/05/2005 Document Revised: 01/08/2016 Document Reviewed: 10/22/2014  [employer] Interactive Patient Education ©2017 [employer]      Back Exercises  If you have pain in your back, do these  exercises 2-3 times each day or as told by your doctor. When the pain goes away, do the exercises once each day, but repeat the steps more times for each exercise (do more repetitions). If you do not have pain in your back, do these exercises once each day or as told by your doctor.  EXERCISES  Single Knee to Chest  Do these steps 3-5 times in a row for each le. Lie on your back on a firm bed or the floor with your legs stretched out.  2. Bring one knee to your chest.  3. Hold your knee to your chest by grabbing your knee or thigh.  4. Pull on your knee until you feel a gentle stretch in your lower back.  5. Keep doing the stretch for 10-30 seconds.  6. Slowly let go of your leg and straighten it.  Pelvic Tilt  Do these steps 5-10 times in a row:  1. Lie on your back on a firm bed or the floor with your legs stretched out.  2. Bend your knees so they point up to the ceiling. Your feet should be flat on the floor.  3. Tighten your lower belly (abdomen) muscles to press your lower back against the floor. This will make your tailbone point up to the ceiling instead of pointing down to your feet or the floor.  4. Stay in this position for 5-10 seconds while you gently tighten your muscles and breathe evenly.  Cat-Cow  Do these steps until your lower back bends more easily:  1. Get on your hands and knees on a firm surface. Keep your hands under your shoulders, and keep your knees under your hips. You may put padding under your knees.  2. Let your head hang down, and make your tailbone point down to the floor so your lower back is round like the back of a cat.  3. Stay in this position for 5 seconds.  4. Slowly lift your head and make your tailbone point up to the ceiling so your back hangs low (sags) like the back of a cow.  5. Stay in this position for 5 seconds.  Press-Ups  Do these steps 5-10 times in a row:  1. Lie on your belly (face-down) on the floor.  2. Place your hands near your head, about shoulder-width  apart.  3. While you keep your back relaxed and keep your hips on the floor, slowly straighten your arms to raise the top half of your body and lift your shoulders. Do not use your back muscles. To make yourself more comfortable, you may change where you place your hands.  4. Stay in this position for 5 seconds.  5. Slowly return to lying flat on the floor.  Bridges  Do these steps 10 times in a row:  1. Lie on your back on a firm surface.  2. Bend your knees so they point up to the ceiling. Your feet should be flat on the floor.  3. Tighten your butt muscles and lift your butt off of the floor until your waist is almost as high as your knees. If you do not feel the muscles working in your butt and the back of your thighs, slide your feet 1-2 inches farther away from your butt.  4. Stay in this position for 3-5 seconds.  5. Slowly lower your butt to the floor, and let your butt muscles relax.  If this exercise is too easy, try doing it with your arms crossed over your chest.  Belly Crunches  Do these steps 5-10 times in a row:  1. Lie on your back on a firm bed or the floor with your legs stretched out.  2. Bend your knees so they point up to the ceiling. Your feet should be flat on the floor.  3. Cross your arms over your chest.  4. Tip your chin a little bit toward your chest but do not bend your neck.  5. Tighten your belly muscles and slowly raise your chest just enough to lift your shoulder blades a tiny bit off of the floor.  6. Slowly lower your chest and your head to the floor.  Back Lifts  Do these steps 5-10 times in a row:  1. Lie on your belly (face-down) with your arms at your sides, and rest your forehead on the floor.  2. Tighten the muscles in your legs and your butt.  3. Slowly lift your chest off of the floor while you keep your hips on the floor. Keep the back of your head in line with the curve in your back. Look at the floor while you do this.  4. Stay in this position for 3-5  seconds.  5. Slowly lower your chest and your face to the floor.  GET HELP IF:  · Your back pain gets a lot worse when you do an exercise.  · Your back pain does not lessen 2 hours after you exercise.  If you have any of these problems, stop doing the exercises. Do not do them again unless your doctor says it is okay.  GET HELP RIGHT AWAY IF:  · You have sudden, very bad back pain. If this happens, stop doing the exercises. Do not do them again unless your doctor says it is okay.     This information is not intended to replace advice given to you by your health care provider. Make sure you discuss any questions you have with your health care provider.     Document Released: 01/20/2012 Document Revised: 04/10/2017 Document Reviewed: 02/11/2016  [employer] Interactive Patient Education ©2017 [employer]

## 2017-11-16 NOTE — PROGRESS NOTES
Patient Name: Manuel Bellamy    SUBJECTIVE    Manuel is a 42 y.o. male presenting for Annual Exam (CPE, lab results )    He is doing well today with several issues to discuss. He is up to date on his health maintenance. He is exercising, but not enough. He has two issues that he would like to discuss today and they are both new to me.     He has had some dizziness with standing quickly for a few months. He does not drink enough water and it always sounds as though it is occurring with changing positions. No CP or SOB. No visual changes.     Low back pain has been going on for a few months as well. No tingling or numbness. Pain is dull around the lumbar area. No trauma or weakness. He has not tried anything that helps. He notices that it is worse when getting out of his truck from sitting.       Well Adult Physical   Patient here for a comprehensive physical exam.The patient reports problems - low back pain as well as dizziness    Do you take any herbs or supplements that were not prescribed by a doctor? yes   Are you taking calcium supplements? no   Are you taking aspirin daily? yes      Manuel Bellamy 42 y.o. male who presents for an Annual Wellness Visit.  he has a history of   Patient Active Problem List   Diagnosis   • Chronic seasonal allergic rhinitis   • Depression   • Psychophysiological insomnia   • Vitamin D deficiency   • Anxiety   • Mixed hyperlipidemia   • Orthostatic hypotension   • Chronic bilateral low back pain without sciatica   .  he has been doing well with new interval problems.      Health Habits:  Dental Exam. up to date  Eye Exam. not up to date - will schedule  Exercise: 5 times/week.  Current exercise activities include: karate with kids (cardio class), walking the dog     Health history:  Prostate cancer screening:N/A   Colonoscopy:N/A   Tob use:N/A   Qualifies for lung Ca screening?N/A       The following portions of the patient's history were reviewed and updated as appropriate:  "allergies, current medications, past family history, past medical history, past social history, past surgical history and problem list.    Review of Systems   Constitutional: Negative for chills, fatigue and fever.   HENT: Negative for congestion and rhinorrhea.    Respiratory: Negative for cough and shortness of breath.    Cardiovascular: Negative for chest pain and palpitations.   Gastrointestinal: Negative for abdominal pain, constipation, diarrhea, nausea and vomiting.   Genitourinary: Negative for difficulty urinating and dysuria.   Musculoskeletal: Positive for back pain (stiff  back after sitting for a long period of time ).   Neurological: Positive for dizziness. Negative for numbness and headaches.   Psychiatric/Behavioral: Negative for decreased concentration, dysphoric mood and sleep disturbance.       Tessalon [benzonatate]      Current Outpatient Prescriptions:   •  aspirin 81 MG EC tablet, Take 81 mg by mouth Daily., Disp: , Rfl:   •  Cholecalciferol (VITAMIN D-3) 1000 units capsule, Take  by mouth., Disp: , Rfl:   •  citalopram (CeleXA) 20 MG tablet, Take 0.5 tablets by mouth Daily., Disp: 30 tablet, Rfl: 3  •  fluticasone (VERAMYST) 27.5 MCG/SPRAY nasal spray, 2 sprays into each nostril Daily., Disp: , Rfl:   •  Loratadine 10 MG capsule, Take  by mouth., Disp: , Rfl:   •  melatonin 5 MG tablet tablet, Take 5 mg by mouth., Disp: , Rfl:   •  meloxicam (MOBIC) 15 MG tablet, Take 1 tablet by mouth Daily., Disp: 30 tablet, Rfl: 5    OBJECTIVE    /76 (BP Location: Left arm, Patient Position: Sitting, Cuff Size: Adult)  Pulse 85  Ht 72\" (182.9 cm)  Wt 223 lb (101 kg)  SpO2 98%  BMI 30.24 kg/m2    Physical Exam   Constitutional: He is oriented to person, place, and time. He appears well-developed and well-nourished. No distress.   HENT:   Head: Normocephalic and atraumatic.   Right Ear: Hearing and external ear normal.   Left Ear: Hearing and external ear normal.   Nose: Nose normal. "   Mouth/Throat: Uvula is midline, oropharynx is clear and moist and mucous membranes are normal. No oropharyngeal exudate. Tonsils are 0 on the right. Tonsils are 0 on the left. No tonsillar exudate.   Cerumen impaction bilaterally with very small canals    Eyes: Conjunctivae, EOM and lids are normal. Right eye exhibits no discharge. Left eye exhibits no discharge. No scleral icterus.   Neck: Trachea normal and full passive range of motion without pain. Neck supple. Carotid bruit is not present. No thyroid mass and no thyromegaly present.   Cardiovascular: Normal rate, regular rhythm and normal heart sounds.  Exam reveals no gallop and no friction rub.    No murmur heard.  Pulmonary/Chest: Effort normal and breath sounds normal. No respiratory distress. He has no wheezes. He has no rales.   Abdominal: Soft. Bowel sounds are normal. He exhibits no distension and no mass. There is no tenderness. There is no guarding.   Musculoskeletal:        Lumbar back: Normal.   Lymphadenopathy:     He has no cervical adenopathy.        Right: No supraclavicular adenopathy present.        Left: No supraclavicular adenopathy present.   Neurological: He is alert and oriented to person, place, and time. He displays no atrophy. He exhibits normal muscle tone. Coordination and gait normal.   Reflex Scores:       Tricep reflexes are 2+ on the right side and 2+ on the left side.       Bicep reflexes are 2+ on the right side and 2+ on the left side.       Brachioradialis reflexes are 2+ on the right side and 2+ on the left side.       Patellar reflexes are 2+ on the right side and 2+ on the left side.  Skin: Skin is warm. No rash noted.   Psychiatric: He has a normal mood and affect. His behavior is normal.   Nursing note and vitals reviewed.        ECG 12 Lead  Date/Time: 11/16/2017 3:21 PM  Performed by: JAVY FLORIAN  Authorized by: JAVY FLORIAN   Comparison: not compared with previous ECG   Previous ECG: no previous ECG  available  Rhythm: sinus rhythm  Rate: normal  ST Segments: ST segments normal  T Waves: T waves normal  QRS axis: normal  Other: no other findings  Clinical impression: normal ECG            ASSESSMENT AND PLAN    Vaccines are up to date. I want him to begin progressive daily aerobic exercise program, follow a low fat, low cholesterol diet, attempt to lose weight, reduce salt in diet and cooking, continue current medications,and return for routine annual checkups and will repeat LP in 6 months.     His EKG is normal and I think that his dizziness that he is having is actually related to orthostasis from not drinking enough water. I encouraged him to drink water until urine is clear or light yellow and if still having issues to return to clinic and we will do Holter or consider imaging of his head to rule out neurological or cardio issue although I feel that these are unlikely given his history and symtoms. He has bilateral cerumen impaction as well with small ear canals and is going to get his ears cleaned out by ENT due to fullness and dizziness. This may be contributing as well. We were unable to clean them out here in the office with irrigation.     Low back sounds as though it is MSK. Will start stretches and if not better, will go see PT for formal evaluation.   Manuel was seen today for annual exam.    Diagnoses and all orders for this visit:    Encounter for annual physical exam  -     ECG 12 Lead    Mixed hyperlipidemia  -     Lipid Panel With LDL / HDL Ratio; Future    Chronic bilateral low back pain without sciatica    Orthostatic hypotension    Bilateral impacted cerumen  -     Ambulatory Referral to ENT (Otolaryngology)         Return in about 6 months (around 5/16/2018) for Recheck.

## 2018-05-11 DIAGNOSIS — I95.1 ORTHOSTATIC HYPOTENSION: ICD-10-CM

## 2018-05-11 DIAGNOSIS — E78.2 MIXED HYPERLIPIDEMIA: Primary | ICD-10-CM

## 2018-05-14 ENCOUNTER — LAB (OUTPATIENT)
Dept: INTERNAL MEDICINE | Facility: CLINIC | Age: 43
End: 2018-05-14

## 2018-05-14 DIAGNOSIS — E78.2 MIXED HYPERLIPIDEMIA: ICD-10-CM

## 2018-05-14 DIAGNOSIS — E78.2 MIXED HYPERLIPIDEMIA: Primary | ICD-10-CM

## 2018-05-14 DIAGNOSIS — I95.1 ORTHOSTATIC HYPOTENSION: ICD-10-CM

## 2018-05-14 LAB
ALBUMIN SERPL-MCNC: 4.2 G/DL (ref 3.5–5.2)
ALBUMIN/GLOB SERPL: 1.4 G/DL
ALP SERPL-CCNC: 87 U/L (ref 40–129)
ALT SERPL-CCNC: 22 U/L (ref 5–41)
AST SERPL-CCNC: 19 U/L (ref 5–40)
BASOPHILS # BLD AUTO: 0.04 10*3/MM3 (ref 0–0.2)
BASOPHILS NFR BLD AUTO: 0.6 % (ref 0–2)
BILIRUB SERPL-MCNC: 0.4 MG/DL (ref 0.2–1.2)
BUN SERPL-MCNC: 17 MG/DL (ref 6–20)
BUN/CREAT SERPL: 21.8 (ref 7–25)
CALCIUM SERPL-MCNC: 8.9 MG/DL (ref 8.6–10.5)
CHLORIDE SERPL-SCNC: 102 MMOL/L (ref 98–107)
CHOLEST SERPL-MCNC: 206 MG/DL (ref 0–200)
CO2 SERPL-SCNC: 26.8 MMOL/L (ref 22–29)
CREAT SERPL-MCNC: 0.78 MG/DL (ref 0.76–1.27)
EOSINOPHIL # BLD AUTO: 0.28 10*3/MM3 (ref 0.1–0.3)
EOSINOPHIL NFR BLD AUTO: 4.3 % (ref 0–4)
ERYTHROCYTE [DISTWIDTH] IN BLOOD BY AUTOMATED COUNT: 12.3 % (ref 11.5–14.5)
GFR SERPLBLD CREATININE-BSD FMLA CKD-EPI: 108 ML/MIN/1.73
GFR SERPLBLD CREATININE-BSD FMLA CKD-EPI: 131 ML/MIN/1.73
GLOBULIN SER CALC-MCNC: 2.9 GM/DL
GLUCOSE SERPL-MCNC: 101 MG/DL (ref 65–99)
HCT VFR BLD AUTO: 44.5 % (ref 42–52)
HDLC SERPL-MCNC: 38 MG/DL (ref 40–60)
HGB BLD-MCNC: 15.3 G/DL (ref 14–18)
IMM GRANULOCYTES # BLD: 0.01 10*3/MM3 (ref 0–0.03)
IMM GRANULOCYTES NFR BLD: 0.2 % (ref 0–0.5)
LDLC SERPL CALC-MCNC: 123 MG/DL (ref 0–100)
LDLC/HDLC SERPL: 3.24 {RATIO}
LYMPHOCYTES # BLD AUTO: 2.31 10*3/MM3 (ref 0.6–4.8)
LYMPHOCYTES NFR BLD AUTO: 35.5 % (ref 20–45)
MCH RBC QN AUTO: 31.4 PG (ref 27–31)
MCHC RBC AUTO-ENTMCNC: 34.4 G/DL (ref 31–37)
MCV RBC AUTO: 91.2 FL (ref 80–94)
MONOCYTES # BLD AUTO: 0.61 10*3/MM3 (ref 0–1)
MONOCYTES NFR BLD AUTO: 9.4 % (ref 3–8)
NEUTROPHILS # BLD AUTO: 3.25 10*3/MM3 (ref 1.5–8.3)
NEUTROPHILS NFR BLD AUTO: 50 % (ref 45–70)
NRBC BLD AUTO-RTO: 0 /100 WBC (ref 0–0)
PLATELET # BLD AUTO: 253 10*3/MM3 (ref 140–500)
POTASSIUM SERPL-SCNC: 4.4 MMOL/L (ref 3.5–5.2)
PROT SERPL-MCNC: 7.1 G/DL (ref 6–8.5)
RBC # BLD AUTO: 4.88 10*6/MM3 (ref 4.7–6.1)
SODIUM SERPL-SCNC: 139 MMOL/L (ref 136–145)
TRIGL SERPL-MCNC: 224 MG/DL (ref 0–150)
VLDLC SERPL CALC-MCNC: 44.8 MG/DL (ref 8–32)
WBC # BLD AUTO: 6.5 10*3/MM3 (ref 4.8–10.8)

## 2018-05-18 ENCOUNTER — OFFICE VISIT (OUTPATIENT)
Dept: INTERNAL MEDICINE | Facility: CLINIC | Age: 43
End: 2018-05-18

## 2018-05-18 VITALS
HEART RATE: 88 BPM | BODY MASS INDEX: 30.48 KG/M2 | RESPIRATION RATE: 18 BRPM | DIASTOLIC BLOOD PRESSURE: 74 MMHG | SYSTOLIC BLOOD PRESSURE: 128 MMHG | HEIGHT: 72 IN | OXYGEN SATURATION: 98 % | WEIGHT: 225 LBS | TEMPERATURE: 98.7 F

## 2018-05-18 DIAGNOSIS — Z23 NEED FOR HEPATITIS A VACCINATION: ICD-10-CM

## 2018-05-18 DIAGNOSIS — R73.01 IFG (IMPAIRED FASTING GLUCOSE): ICD-10-CM

## 2018-05-18 DIAGNOSIS — F32.A DEPRESSION, UNSPECIFIED DEPRESSION TYPE: ICD-10-CM

## 2018-05-18 DIAGNOSIS — F51.04 PSYCHOPHYSIOLOGICAL INSOMNIA: ICD-10-CM

## 2018-05-18 DIAGNOSIS — F41.9 ANXIETY: ICD-10-CM

## 2018-05-18 DIAGNOSIS — E78.2 MIXED HYPERLIPIDEMIA: Primary | ICD-10-CM

## 2018-05-18 DIAGNOSIS — E66.9 OBESITY (BMI 30-39.9): ICD-10-CM

## 2018-05-18 PROCEDURE — 99214 OFFICE O/P EST MOD 30 MIN: CPT | Performed by: INTERNAL MEDICINE

## 2018-05-18 PROCEDURE — 90471 IMMUNIZATION ADMIN: CPT | Performed by: INTERNAL MEDICINE

## 2018-05-18 PROCEDURE — 90632 HEPA VACCINE ADULT IM: CPT | Performed by: INTERNAL MEDICINE

## 2018-05-18 RX ORDER — ALBUTEROL SULFATE 90 UG/1
2 AEROSOL, METERED RESPIRATORY (INHALATION) EVERY 4 HOURS PRN
Qty: 1 INHALER | Refills: 1 | Status: SHIPPED | OUTPATIENT
Start: 2018-05-18 | End: 2019-02-15

## 2018-05-18 NOTE — PROGRESS NOTES
Manuel Bellamy is a 43 y.o. male, who presents with a chief complaint of   Chief Complaint   Patient presents with   • Follow-up     6 month f/u, lab results    • Hyperlipidemia       42 yo M here for follow and doing well. He has had a cough recently that he thinks is related to allergies. He is taking Claritin and Flonase that has helped some. No SOB and no productive cough.     He is walking every night with the dog. He is active outdoors during the summer. He is not eating well all the time. He eats cereal for breakfast. He eats pastas and breads for lunch. For dinner his wife fixes chicken and vegetables. He has chronic HLD that has not changed.     Anxiety and depression are stable on Celexa. He feels well and doesn't have attacks regularly. Sleeps well.              The following portions of the patient's history were reviewed and updated as appropriate: allergies, current medications, past family history, past medical history, past social history, past surgical history and problem list.    Allergies: Tessalon [benzonatate]    Current Outpatient Prescriptions:   •  citalopram (CeleXA) 20 MG tablet, Take 0.5 tablets by mouth Daily., Disp: 30 tablet, Rfl: 3  •  fluticasone (VERAMYST) 27.5 MCG/SPRAY nasal spray, 2 sprays into each nostril Daily., Disp: , Rfl:   •  Loratadine 10 MG capsule, Take  by mouth., Disp: , Rfl:   •  melatonin 5 MG tablet tablet, Take 5 mg by mouth., Disp: , Rfl:   •  albuterol (PROVENTIL HFA;VENTOLIN HFA) 108 (90 Base) MCG/ACT inhaler, Inhale 2 puffs Every 4 (Four) Hours As Needed for Wheezing or Shortness of Air., Disp: 1 inhaler, Rfl: 1  Medications Discontinued During This Encounter   Medication Reason   • Cholecalciferol (VITAMIN D-3) 1000 units capsule *Therapy completed   • aspirin 81 MG EC tablet *Therapy completed   • meloxicam (MOBIC) 15 MG tablet *Therapy completed       Review of Systems   Constitutional: Negative for chills, fatigue and fever.   HENT: Positive for  "postnasal drip.    Respiratory: Positive for cough. Negative for shortness of breath.    Gastrointestinal: Negative for diarrhea, nausea and vomiting.   Skin: Negative for rash.   Allergic/Immunologic: Positive for environmental allergies.             /74 (BP Location: Left arm, Patient Position: Sitting, Cuff Size: Adult)   Pulse 88   Temp 98.7 °F (37.1 °C) (Oral)   Resp 18   Ht 182.9 cm (72.01\")   Wt 102 kg (225 lb)   SpO2 98%   BMI 30.51 kg/m²       Physical Exam   Constitutional: He is oriented to person, place, and time. He appears well-developed and well-nourished. No distress.   HENT:   Head: Normocephalic and atraumatic.   Right Ear: Hearing, tympanic membrane and external ear normal.   Left Ear: Hearing, tympanic membrane and external ear normal.   Nose: Nose normal.   Mouth/Throat: Uvula is midline, oropharynx is clear and moist and mucous membranes are normal. No oropharyngeal exudate. Tonsils are 0 on the right. Tonsils are 0 on the left. No tonsillar exudate.   Eyes: Conjunctivae are normal. Right eye exhibits no discharge. Left eye exhibits no discharge. No scleral icterus.   Neck: Neck supple.   Cardiovascular: Normal rate, regular rhythm and normal heart sounds.  Exam reveals no gallop and no friction rub.    No murmur heard.  Pulmonary/Chest: Effort normal and breath sounds normal. No respiratory distress. He has no wheezes. He has no rales.   Lymphadenopathy:     He has no cervical adenopathy.   Neurological: He is alert and oriented to person, place, and time.   Skin: Skin is warm. No rash noted.   Psychiatric: He has a normal mood and affect. His behavior is normal.   Nursing note and vitals reviewed.        Results for orders placed or performed in visit on 05/14/18   Comprehensive metabolic panel   Result Value Ref Range    Glucose 101 (H) 65 - 99 mg/dL    BUN 17 6 - 20 mg/dL    Creatinine 0.78 0.76 - 1.27 mg/dL    eGFR Non African Am 108 >60 mL/min/1.73    eGFR African Am 131 >60 " mL/min/1.73    BUN/Creatinine Ratio 21.8 7.0 - 25.0    Sodium 139 136 - 145 mmol/L    Potassium 4.4 3.5 - 5.2 mmol/L    Chloride 102 98 - 107 mmol/L    Total CO2 26.8 22.0 - 29.0 mmol/L    Calcium 8.9 8.6 - 10.5 mg/dL    Total Protein 7.1 6.0 - 8.5 g/dL    Albumin 4.20 3.50 - 5.20 g/dL    Globulin 2.9 gm/dL    A/G Ratio 1.4 g/dL    Total Bilirubin 0.4 0.2 - 1.2 mg/dL    Alkaline Phosphatase 87 40 - 129 U/L    AST (SGOT) 19 5 - 40 U/L    ALT (SGPT) 22 5 - 41 U/L   Lipid Panel With LDL / HDL Ratio   Result Value Ref Range    Total Cholesterol 206 (H) 0 - 200 mg/dL    Triglycerides 224 (H) 0 - 150 mg/dL    HDL Cholesterol 38 (L) 40 - 60 mg/dL    VLDL Cholesterol 44.8 (H) 8 - 32 mg/dL    LDL Cholesterol  123 (H) 0 - 100 mg/dL    LDL/HDL Ratio 3.24    CBC & Differential   Result Value Ref Range    WBC 6.50 4.80 - 10.80 10*3/mm3    RBC 4.88 4.70 - 6.10 10*6/mm3    Hemoglobin 15.3 14.0 - 18.0 g/dL    Hematocrit 44.5 42.0 - 52.0 %    MCV 91.2 80.0 - 94.0 fL    MCH 31.4 (H) 27.0 - 31.0 pg    MCHC 34.4 31.0 - 37.0 g/dL    RDW 12.3 11.5 - 14.5 %    Platelets 253 140 - 500 10*3/mm3    Neutrophil Rel % 50.0 45.0 - 70.0 %    Lymphocyte Rel % 35.5 20.0 - 45.0 %    Monocyte Rel % 9.4 (H) 3.0 - 8.0 %    Eosinophil Rel % 4.3 (H) 0.0 - 4.0 %    Basophil Rel % 0.6 0.0 - 2.0 %    Neutrophils Absolute 3.25 1.50 - 8.30 10*3/mm3    Lymphocytes Absolute 2.31 0.60 - 4.80 10*3/mm3    Monocytes Absolute 0.61 0.00 - 1.00 10*3/mm3    Eosinophils Absolute 0.28 0.10 - 0.30 10*3/mm3    Basophils Absolute 0.04 0.00 - 0.20 10*3/mm3    Immature Granulocyte Rel % 0.2 0.0 - 0.5 %    Immature Grans Absolute 0.01 0.00 - 0.03 10*3/mm3    nRBC 0.0 0.0 - 0.0 /100 WBC           Manuel was seen today for follow-up and hyperlipidemia.    Diagnoses and all orders for this visit:    Mixed hyperlipidemia    Depression, unspecified depression type    Anxiety    Psychophysiological insomnia    IFG (impaired fasting glucose)    Need for hepatitis A  vaccination    Other orders  -     Hepatitis A Vaccine Adult IM  -     albuterol (PROVENTIL HFA;VENTOLIN HFA) 108 (90 Base) MCG/ACT inhaler; Inhale 2 puffs Every 4 (Four) Hours As Needed for Wheezing or Shortness of Air.      Patient's cholesterol is not under good control. Needs to increase exercise and eat better Will follow up in 4-5 months to monitor levels.     Anxiety and depression are stable on Celexa. Will continue and follow up in 4-5 months.     Patient's BG's have been elevated. Will continue to focus on diet and exercise. Will follow up in 4-5 months.     Hep A today     Albuterol as needed for cough which is likely RAD from allergies since it's seasonal        Return in about 4 months (around 10/1/2018) for Recheck.    Idalmis Whitehead MD  05/18/2018

## 2018-06-18 ENCOUNTER — OFFICE VISIT (OUTPATIENT)
Dept: INTERNAL MEDICINE | Facility: CLINIC | Age: 43
End: 2018-06-18

## 2018-06-18 VITALS
TEMPERATURE: 98.3 F | HEART RATE: 97 BPM | OXYGEN SATURATION: 98 % | SYSTOLIC BLOOD PRESSURE: 138 MMHG | RESPIRATION RATE: 18 BRPM | WEIGHT: 228 LBS | DIASTOLIC BLOOD PRESSURE: 88 MMHG | BODY MASS INDEX: 30.88 KG/M2 | HEIGHT: 72 IN

## 2018-06-18 DIAGNOSIS — R05.9 COUGH IN ADULT: ICD-10-CM

## 2018-06-18 DIAGNOSIS — R09.82 PND (POST-NASAL DRIP): ICD-10-CM

## 2018-06-18 DIAGNOSIS — R11.10 POST-TUSSIVE EMESIS: ICD-10-CM

## 2018-06-18 DIAGNOSIS — J30.2 SEASONAL ALLERGIC RHINITIS, UNSPECIFIED CHRONICITY, UNSPECIFIED TRIGGER: Primary | ICD-10-CM

## 2018-06-18 PROCEDURE — 99214 OFFICE O/P EST MOD 30 MIN: CPT | Performed by: INTERNAL MEDICINE

## 2018-06-18 RX ORDER — CETIRIZINE HYDROCHLORIDE 10 MG/1
10 TABLET ORAL DAILY
COMMUNITY
End: 2018-09-12

## 2018-06-18 RX ORDER — MONTELUKAST SODIUM 10 MG/1
10 TABLET ORAL NIGHTLY
Qty: 30 TABLET | Refills: 1 | Status: SHIPPED | OUTPATIENT
Start: 2018-06-18 | End: 2018-09-12

## 2018-06-18 NOTE — PROGRESS NOTES
"      Manuel Bellamy is a 43 y.o. male, who presents with a chief complaint of   Chief Complaint   Patient presents with   • Cough   • Vomiting   • Wheezing       42 yo M with severe allergies here with several weeks of cough and allergies. He has thrown up a few times after coughing. He is wheezing as well from time to time. Eating and drinking make him cough as well. a         The following portions of the patient's history were reviewed and updated as appropriate: allergies, current medications, past family history, past medical history, past social history, past surgical history and problem list.    Allergies: Tessalon [benzonatate]    Current Outpatient Prescriptions:   •  albuterol (PROVENTIL HFA;VENTOLIN HFA) 108 (90 Base) MCG/ACT inhaler, Inhale 2 puffs Every 4 (Four) Hours As Needed for Wheezing or Shortness of Air., Disp: 1 inhaler, Rfl: 1  •  cetirizine (zyrTEC) 10 MG tablet, Take 10 mg by mouth Daily., Disp: , Rfl:   •  citalopram (CeleXA) 20 MG tablet, Take 0.5 tablets by mouth Daily., Disp: 30 tablet, Rfl: 3  •  fluticasone (VERAMYST) 27.5 MCG/SPRAY nasal spray, 2 sprays into each nostril Daily., Disp: , Rfl:   •  melatonin 5 MG tablet tablet, Take 5 mg by mouth., Disp: , Rfl:   •  montelukast (SINGULAIR) 10 MG tablet, Take 1 tablet by mouth Every Night., Disp: 30 tablet, Rfl: 1  Medications Discontinued During This Encounter   Medication Reason   • Loratadine 10 MG capsule *Therapy completed       Review of Systems   Constitutional: Negative for chills, fatigue and fever.   Respiratory: Positive for cough, shortness of breath and wheezing.    Gastrointestinal: Positive for vomiting. Negative for nausea.   Allergic/Immunologic: Positive for environmental allergies.             /88 (BP Location: Left arm, Patient Position: Sitting, Cuff Size: Large Adult)   Pulse 97   Temp 98.3 °F (36.8 °C) (Oral)   Resp 18   Ht 182.9 cm (72.01\")   Wt 103 kg (228 lb)   SpO2 98%   BMI 30.92 kg/m² "       Physical Exam   Constitutional: He is oriented to person, place, and time. He appears well-developed and well-nourished. No distress.   HENT:   Head: Normocephalic and atraumatic.   Right Ear: Hearing, tympanic membrane and external ear normal.   Left Ear: Hearing, tympanic membrane and external ear normal.   Nose: Nose normal.   Mouth/Throat: Uvula is midline. Posterior oropharyngeal edema and posterior oropharyngeal erythema present. No oropharyngeal exudate.   Eyes: Conjunctivae are normal. Right eye exhibits no discharge. Left eye exhibits no discharge. No scleral icterus.   Neck: Neck supple.   Cardiovascular: Normal rate, regular rhythm and normal heart sounds.  Exam reveals no gallop and no friction rub.    No murmur heard.  Pulmonary/Chest: Effort normal and breath sounds normal. No respiratory distress. He has no wheezes. He has no rales.   Lymphadenopathy:     He has no cervical adenopathy.   Neurological: He is alert and oriented to person, place, and time.   Skin: Skin is warm. No rash noted.   Psychiatric: He has a normal mood and affect. His behavior is normal.   Nursing note and vitals reviewed.        Results for orders placed or performed in visit on 05/14/18   Comprehensive metabolic panel   Result Value Ref Range    Glucose 101 (H) 65 - 99 mg/dL    BUN 17 6 - 20 mg/dL    Creatinine 0.78 0.76 - 1.27 mg/dL    eGFR Non African Am 108 >60 mL/min/1.73    eGFR African Am 131 >60 mL/min/1.73    BUN/Creatinine Ratio 21.8 7.0 - 25.0    Sodium 139 136 - 145 mmol/L    Potassium 4.4 3.5 - 5.2 mmol/L    Chloride 102 98 - 107 mmol/L    Total CO2 26.8 22.0 - 29.0 mmol/L    Calcium 8.9 8.6 - 10.5 mg/dL    Total Protein 7.1 6.0 - 8.5 g/dL    Albumin 4.20 3.50 - 5.20 g/dL    Globulin 2.9 gm/dL    A/G Ratio 1.4 g/dL    Total Bilirubin 0.4 0.2 - 1.2 mg/dL    Alkaline Phosphatase 87 40 - 129 U/L    AST (SGOT) 19 5 - 40 U/L    ALT (SGPT) 22 5 - 41 U/L   Lipid Panel With LDL / HDL Ratio   Result Value Ref Range     Total Cholesterol 206 (H) 0 - 200 mg/dL    Triglycerides 224 (H) 0 - 150 mg/dL    HDL Cholesterol 38 (L) 40 - 60 mg/dL    VLDL Cholesterol 44.8 (H) 8 - 32 mg/dL    LDL Cholesterol  123 (H) 0 - 100 mg/dL    LDL/HDL Ratio 3.24    CBC & Differential   Result Value Ref Range    WBC 6.50 4.80 - 10.80 10*3/mm3    RBC 4.88 4.70 - 6.10 10*6/mm3    Hemoglobin 15.3 14.0 - 18.0 g/dL    Hematocrit 44.5 42.0 - 52.0 %    MCV 91.2 80.0 - 94.0 fL    MCH 31.4 (H) 27.0 - 31.0 pg    MCHC 34.4 31.0 - 37.0 g/dL    RDW 12.3 11.5 - 14.5 %    Platelets 253 140 - 500 10*3/mm3    Neutrophil Rel % 50.0 45.0 - 70.0 %    Lymphocyte Rel % 35.5 20.0 - 45.0 %    Monocyte Rel % 9.4 (H) 3.0 - 8.0 %    Eosinophil Rel % 4.3 (H) 0.0 - 4.0 %    Basophil Rel % 0.6 0.0 - 2.0 %    Neutrophils Absolute 3.25 1.50 - 8.30 10*3/mm3    Lymphocytes Absolute 2.31 0.60 - 4.80 10*3/mm3    Monocytes Absolute 0.61 0.00 - 1.00 10*3/mm3    Eosinophils Absolute 0.28 0.10 - 0.30 10*3/mm3    Basophils Absolute 0.04 0.00 - 0.20 10*3/mm3    Immature Granulocyte Rel % 0.2 0.0 - 0.5 %    Immature Grans Absolute 0.01 0.00 - 0.03 10*3/mm3    nRBC 0.0 0.0 - 0.0 /100 WBC           Manuel was seen today for cough, vomiting and wheezing.    Diagnoses and all orders for this visit:    Seasonal allergic rhinitis, unspecified chronicity, unspecified trigger  -     Ambulatory Referral to Allergy    PND (post-nasal drip)    Post-tussive emesis    Cough in adult  -     Ambulatory Referral to Allergy    Other orders  -     montelukast (SINGULAIR) 10 MG tablet; Take 1 tablet by mouth Every Night.      Change Claritin to Zyrtec  Start nasal rinse   Continue Flonase and add Singulair   Will send to allergy for allergy testing and PFT's to further evaluate cough   I think that emesis is related to severe allergies and PND   Will call if not better after evaluation by allergist  Spent 50% of 25 minutes in counseling regarding treatment of cough, allergies and emesis     Return if symptoms  worsen or fail to improve.    Idalmis Whitehead MD  06/18/2018

## 2018-09-12 ENCOUNTER — OFFICE VISIT (OUTPATIENT)
Dept: INTERNAL MEDICINE | Facility: CLINIC | Age: 43
End: 2018-09-12

## 2018-09-12 VITALS
OXYGEN SATURATION: 97 % | BODY MASS INDEX: 30.61 KG/M2 | SYSTOLIC BLOOD PRESSURE: 118 MMHG | WEIGHT: 226 LBS | HEIGHT: 72 IN | DIASTOLIC BLOOD PRESSURE: 82 MMHG | HEART RATE: 97 BPM

## 2018-09-12 DIAGNOSIS — J30.1 CHRONIC SEASONAL ALLERGIC RHINITIS DUE TO POLLEN: Primary | ICD-10-CM

## 2018-09-12 DIAGNOSIS — S93.402A SPRAIN OF LEFT ANKLE, UNSPECIFIED LIGAMENT, INITIAL ENCOUNTER: ICD-10-CM

## 2018-09-12 DIAGNOSIS — J98.01 COUGH DUE TO BRONCHOSPASM: ICD-10-CM

## 2018-09-12 PROCEDURE — 99214 OFFICE O/P EST MOD 30 MIN: CPT | Performed by: INTERNAL MEDICINE

## 2018-09-12 RX ORDER — MONTELUKAST SODIUM 10 MG/1
10 TABLET ORAL NIGHTLY
Qty: 90 TABLET | Refills: 1 | Status: SHIPPED | OUTPATIENT
Start: 2018-09-12 | End: 2019-05-10 | Stop reason: SDUPTHER

## 2018-09-12 RX ORDER — DEXTROMETHORPHAN POLISTIREX 30 MG/5ML
SUSPENSION ORAL
COMMUNITY
End: 2019-02-15

## 2018-09-12 RX ORDER — LORATADINE 10 MG/1
1 CAPSULE, LIQUID FILLED ORAL DAILY
COMMUNITY
End: 2018-09-12 | Stop reason: SDUPTHER

## 2018-09-12 RX ORDER — LORATADINE 10 MG/1
1 CAPSULE, LIQUID FILLED ORAL DAILY
Qty: 90 EACH | Refills: 1 | Status: SHIPPED | OUTPATIENT
Start: 2018-09-12 | End: 2019-02-15

## 2018-09-12 RX ORDER — MOMETASONE FUROATE 50 UG/1
2 SPRAY, METERED NASAL DAILY
COMMUNITY
End: 2022-03-31

## 2018-09-12 RX ORDER — BUDESONIDE AND FORMOTEROL FUMARATE DIHYDRATE 160; 4.5 UG/1; UG/1
2 AEROSOL RESPIRATORY (INHALATION)
Qty: 1 INHALER | Refills: 3 | Status: SHIPPED | OUTPATIENT
Start: 2018-09-12 | End: 2019-02-15

## 2018-09-12 NOTE — PROGRESS NOTES
Manuel Bellamy is a 43 y.o. male, who presents with a chief complaint of   Chief Complaint   Patient presents with   • Ankle Pain     L ankle, twisted it on Monday night, he has broken it in the past, he has been using ice, Dr. Herrera gave him Ibuprofen 800mg when he broke it previously, he has been taking one of them in the evening.         HPI   Pt twisted ankle 2 nights ago.  Pt broke ankle last year so he was worried.  He rolled his ankle and had an inversion injury.  Sx improving over the past couple of days.      He has had lots of issues with his allergies.  He has had allergy shots in the past.   He really doesn't want to take shots again.      He has had a cough x  1 mo that is worse with eating, drinking, or lying down.  No pain or heartburn.  He has even thrown up with the cough.  Sx worse if he mows the grass.        The following portions of the patient's history were reviewed and updated as appropriate: allergies, current medications, past family history, past medical history, past social history, past surgical history and problem list.    Allergies: Tessalon [benzonatate]    Review of Systems   Constitutional: Negative.    HENT: Positive for congestion.    Eyes: Negative.    Respiratory: Negative.    Cardiovascular: Negative.    Gastrointestinal: Negative.    Endocrine: Negative.    Genitourinary: Negative.    Musculoskeletal:        Ankle pain   Skin: Negative.    Allergic/Immunologic: Negative.    Neurological: Negative.    Hematological: Negative.    Psychiatric/Behavioral: Negative.    All other systems reviewed and are negative.            Wt Readings from Last 3 Encounters:   09/12/18 103 kg (226 lb)   06/18/18 103 kg (228 lb)   05/18/18 102 kg (225 lb)     Temp Readings from Last 3 Encounters:   06/18/18 98.3 °F (36.8 °C) (Oral)   05/18/18 98.7 °F (37.1 °C) (Oral)   06/26/17 98.4 °F (36.9 °C) (Oral)     BP Readings from Last 3 Encounters:   09/12/18 118/82   06/18/18 138/88   05/18/18  128/74     Pulse Readings from Last 3 Encounters:   09/12/18 97   06/18/18 97   05/18/18 88     Body mass index is 30.65 kg/m².  @LASTSAO2(3)@    Physical Exam   Constitutional: He is oriented to person, place, and time. He appears well-developed and well-nourished. No distress.   HENT:   Head: Normocephalic and atraumatic.   Right Ear: External ear normal.   Left Ear: External ear normal.   Nose: Nose normal.   Mouth/Throat: Oropharynx is clear and moist.   Eyes: Pupils are equal, round, and reactive to light. Conjunctivae and EOM are normal.   Neck: Normal range of motion. Neck supple.   Cardiovascular: Normal rate, regular rhythm, normal heart sounds and intact distal pulses.    Pulmonary/Chest: Effort normal and breath sounds normal. No respiratory distress. He has no wheezes.   Musculoskeletal:   Left lateral mallolus with surrounding soft tissue swelling.  Most ttp over anterior talofibular ligament.  [place] ankle points all negative to tenderness to palpation.    Neurological: He is alert and oriented to person, place, and time.   Skin: Skin is warm and dry.   Psychiatric: He has a normal mood and affect. His behavior is normal. Judgment and thought content normal.   Nursing note and vitals reviewed.      Results for orders placed or performed in visit on 05/14/18   Comprehensive metabolic panel   Result Value Ref Range    Glucose 101 (H) 65 - 99 mg/dL    BUN 17 6 - 20 mg/dL    Creatinine 0.78 0.76 - 1.27 mg/dL    eGFR Non African Am 108 >60 mL/min/1.73    eGFR African Am 131 >60 mL/min/1.73    BUN/Creatinine Ratio 21.8 7.0 - 25.0    Sodium 139 136 - 145 mmol/L    Potassium 4.4 3.5 - 5.2 mmol/L    Chloride 102 98 - 107 mmol/L    Total CO2 26.8 22.0 - 29.0 mmol/L    Calcium 8.9 8.6 - 10.5 mg/dL    Total Protein 7.1 6.0 - 8.5 g/dL    Albumin 4.20 3.50 - 5.20 g/dL    Globulin 2.9 gm/dL    A/G Ratio 1.4 g/dL    Total Bilirubin 0.4 0.2 - 1.2 mg/dL    Alkaline Phosphatase 87 40 - 129 U/L    AST (SGOT) 19 5 - 40 U/L     ALT (SGPT) 22 5 - 41 U/L   Lipid Panel With LDL / HDL Ratio   Result Value Ref Range    Total Cholesterol 206 (H) 0 - 200 mg/dL    Triglycerides 224 (H) 0 - 150 mg/dL    HDL Cholesterol 38 (L) 40 - 60 mg/dL    VLDL Cholesterol 44.8 (H) 8 - 32 mg/dL    LDL Cholesterol  123 (H) 0 - 100 mg/dL    LDL/HDL Ratio 3.24    CBC & Differential   Result Value Ref Range    WBC 6.50 4.80 - 10.80 10*3/mm3    RBC 4.88 4.70 - 6.10 10*6/mm3    Hemoglobin 15.3 14.0 - 18.0 g/dL    Hematocrit 44.5 42.0 - 52.0 %    MCV 91.2 80.0 - 94.0 fL    MCH 31.4 (H) 27.0 - 31.0 pg    MCHC 34.4 31.0 - 37.0 g/dL    RDW 12.3 11.5 - 14.5 %    Platelets 253 140 - 500 10*3/mm3    Neutrophil Rel % 50.0 45.0 - 70.0 %    Lymphocyte Rel % 35.5 20.0 - 45.0 %    Monocyte Rel % 9.4 (H) 3.0 - 8.0 %    Eosinophil Rel % 4.3 (H) 0.0 - 4.0 %    Basophil Rel % 0.6 0.0 - 2.0 %    Neutrophils Absolute 3.25 1.50 - 8.30 10*3/mm3    Lymphocytes Absolute 2.31 0.60 - 4.80 10*3/mm3    Monocytes Absolute 0.61 0.00 - 1.00 10*3/mm3    Eosinophils Absolute 0.28 0.10 - 0.30 10*3/mm3    Basophils Absolute 0.04 0.00 - 0.20 10*3/mm3    Immature Granulocyte Rel % 0.2 0.0 - 0.5 %    Immature Grans Absolute 0.01 0.00 - 0.03 10*3/mm3    nRBC 0.0 0.0 - 0.0 /100 WBC           Manuel was seen today for ankle pain.    Diagnoses and all orders for this visit:    Chronic seasonal allergic rhinitis due to pollen  -     montelukast (SINGULAIR) 10 MG tablet; Take 1 tablet by mouth Every Night.  -     Loratadine 10 MG capsule; Take 1 each by mouth Daily.    Cough due to bronchospasm  -     montelukast (SINGULAIR) 10 MG tablet; Take 1 tablet by mouth Every Night.  -     budesonide-formoterol (SYMBICORT) 160-4.5 MCG/ACT inhaler; Inhale 2 puffs 2 (Two) Times a Day.    Sprain of left ankle, unspecified ligament, initial encounter      RICE therapy.     Outpatient Medications Prior to Visit   Medication Sig Dispense Refill   • albuterol (PROVENTIL HFA;VENTOLIN HFA) 108 (90 Base) MCG/ACT inhaler  Inhale 2 puffs Every 4 (Four) Hours As Needed for Wheezing or Shortness of Air. 1 inhaler 1   • citalopram (CeleXA) 20 MG tablet Take 0.5 tablets by mouth Daily. (Patient taking differently: Take 10 mg by mouth Daily. Every other day) 30 tablet 3   • melatonin 5 MG tablet tablet Take 5 mg by mouth.     • cetirizine (zyrTEC) 10 MG tablet Take 10 mg by mouth Daily.     • fluticasone (VERAMYST) 27.5 MCG/SPRAY nasal spray 2 sprays into each nostril Daily.     • montelukast (SINGULAIR) 10 MG tablet Take 1 tablet by mouth Every Night. 30 tablet 1     No facility-administered medications prior to visit.      New Medications Ordered This Visit   Medications   • montelukast (SINGULAIR) 10 MG tablet     Sig: Take 1 tablet by mouth Every Night.     Dispense:  90 tablet     Refill:  1   • budesonide-formoterol (SYMBICORT) 160-4.5 MCG/ACT inhaler     Sig: Inhale 2 puffs 2 (Two) Times a Day.     Dispense:  1 inhaler     Refill:  3   • Loratadine 10 MG capsule     Sig: Take 1 each by mouth Daily.     Dispense:  90 each     Refill:  1     [unfilled]  Medications Discontinued During This Encounter   Medication Reason   • cetirizine (zyrTEC) 10 MG tablet *Therapy completed   • fluticasone (VERAMYST) 27.5 MCG/SPRAY nasal spray *Therapy completed   • montelukast (SINGULAIR) 10 MG tablet *Therapy completed   • Loratadine 10 MG capsule Reorder         Return in about 4 weeks (around 10/10/2018) for Recheck.

## 2018-09-17 ENCOUNTER — RESULTS ENCOUNTER (OUTPATIENT)
Dept: INTERNAL MEDICINE | Facility: CLINIC | Age: 43
End: 2018-09-17

## 2018-09-17 DIAGNOSIS — E78.2 MIXED HYPERLIPIDEMIA: ICD-10-CM

## 2018-09-17 DIAGNOSIS — R73.01 IFG (IMPAIRED FASTING GLUCOSE): ICD-10-CM

## 2018-10-09 LAB
ALBUMIN SERPL-MCNC: 4.3 G/DL (ref 3.5–5.2)
ALBUMIN/GLOB SERPL: 1.5 G/DL
ALP SERPL-CCNC: 91 U/L (ref 40–129)
ALT SERPL-CCNC: 29 U/L (ref 5–41)
APPEARANCE UR: CLEAR
AST SERPL-CCNC: 21 U/L (ref 5–40)
BACTERIA #/AREA URNS HPF: NORMAL /HPF
BASOPHILS # BLD AUTO: 0.04 10*3/MM3 (ref 0–0.2)
BASOPHILS NFR BLD AUTO: 0.7 % (ref 0–2)
BILIRUB SERPL-MCNC: 0.4 MG/DL (ref 0.2–1.2)
BILIRUB UR QL STRIP: NEGATIVE
BUN SERPL-MCNC: 16 MG/DL (ref 6–20)
BUN/CREAT SERPL: 19.8 (ref 7–25)
CALCIUM SERPL-MCNC: 9.2 MG/DL (ref 8.6–10.5)
CHLORIDE SERPL-SCNC: 101 MMOL/L (ref 98–107)
CHOLEST SERPL-MCNC: 199 MG/DL (ref 0–200)
CO2 SERPL-SCNC: 25.9 MMOL/L (ref 22–29)
COLOR UR: YELLOW
CREAT SERPL-MCNC: 0.81 MG/DL (ref 0.76–1.27)
EOSINOPHIL # BLD AUTO: 0.26 10*3/MM3 (ref 0.1–0.3)
EOSINOPHIL NFR BLD AUTO: 4.3 % (ref 0–4)
EPI CELLS #/AREA URNS HPF: NORMAL /HPF
ERYTHROCYTE [DISTWIDTH] IN BLOOD BY AUTOMATED COUNT: 12.8 % (ref 11.5–14.5)
GLOBULIN SER CALC-MCNC: 2.8 GM/DL
GLUCOSE SERPL-MCNC: 97 MG/DL (ref 65–99)
GLUCOSE UR QL: NEGATIVE
HBA1C MFR BLD: 5.4 % (ref 4.8–5.6)
HCT VFR BLD AUTO: 43.5 % (ref 42–52)
HDLC SERPL-MCNC: 41 MG/DL (ref 40–60)
HGB BLD-MCNC: 15.5 G/DL (ref 14–18)
HGB UR QL STRIP: NEGATIVE
IMM GRANULOCYTES # BLD: 0.01 10*3/MM3 (ref 0–0.03)
IMM GRANULOCYTES NFR BLD: 0.2 % (ref 0–0.5)
KETONES UR QL STRIP: NEGATIVE
LDLC SERPL CALC-MCNC: 119 MG/DL (ref 0–100)
LDLC/HDLC SERPL: 2.91 {RATIO}
LEUKOCYTE ESTERASE UR QL STRIP: NEGATIVE
LYMPHOCYTES # BLD AUTO: 2.18 10*3/MM3 (ref 0.6–4.8)
LYMPHOCYTES NFR BLD AUTO: 36 % (ref 20–45)
MCH RBC QN AUTO: 33.2 PG (ref 27–31)
MCHC RBC AUTO-ENTMCNC: 35.6 G/DL (ref 31–37)
MCV RBC AUTO: 93.1 FL (ref 80–94)
MICRO URNS: NORMAL
MICRO URNS: NORMAL
MONOCYTES # BLD AUTO: 0.55 10*3/MM3 (ref 0–1)
MONOCYTES NFR BLD AUTO: 9.1 % (ref 3–8)
MUCOUS THREADS URNS QL MICRO: PRESENT /HPF
NEUTROPHILS # BLD AUTO: 3.02 10*3/MM3 (ref 1.5–8.3)
NEUTROPHILS NFR BLD AUTO: 49.7 % (ref 45–70)
NITRITE UR QL STRIP: NEGATIVE
NRBC BLD AUTO-RTO: 0 /100 WBC (ref 0–0)
PH UR STRIP: 6.5 [PH] (ref 5–7.5)
PLATELET # BLD AUTO: 246 10*3/MM3 (ref 140–500)
POTASSIUM SERPL-SCNC: 4.7 MMOL/L (ref 3.5–5.2)
PROT SERPL-MCNC: 7.1 G/DL (ref 6–8.5)
PROT UR QL STRIP: NEGATIVE
RBC # BLD AUTO: 4.67 10*6/MM3 (ref 4.7–6.1)
RBC #/AREA URNS HPF: NORMAL /HPF
SODIUM SERPL-SCNC: 139 MMOL/L (ref 136–145)
SP GR UR: 1.03 (ref 1–1.03)
TRIGL SERPL-MCNC: 193 MG/DL (ref 0–150)
URINALYSIS REFLEX: NORMAL
UROBILINOGEN UR STRIP-MCNC: 0.2 MG/DL (ref 0.2–1)
VLDLC SERPL CALC-MCNC: 38.6 MG/DL (ref 8–32)
WBC # BLD AUTO: 6.06 10*3/MM3 (ref 4.8–10.8)
WBC #/AREA URNS HPF: NORMAL /HPF

## 2018-10-12 ENCOUNTER — OFFICE VISIT (OUTPATIENT)
Dept: INTERNAL MEDICINE | Facility: CLINIC | Age: 43
End: 2018-10-12

## 2018-10-12 VITALS
RESPIRATION RATE: 16 BRPM | WEIGHT: 224 LBS | OXYGEN SATURATION: 99 % | SYSTOLIC BLOOD PRESSURE: 122 MMHG | BODY MASS INDEX: 30.34 KG/M2 | HEART RATE: 81 BPM | TEMPERATURE: 97.9 F | DIASTOLIC BLOOD PRESSURE: 82 MMHG | HEIGHT: 72 IN

## 2018-10-12 DIAGNOSIS — F32.A DEPRESSION, UNSPECIFIED DEPRESSION TYPE: ICD-10-CM

## 2018-10-12 DIAGNOSIS — J20.9 ACUTE BRONCHITIS, UNSPECIFIED ORGANISM: ICD-10-CM

## 2018-10-12 DIAGNOSIS — J30.2 CHRONIC SEASONAL ALLERGIC RHINITIS: ICD-10-CM

## 2018-10-12 DIAGNOSIS — F41.9 ANXIETY: ICD-10-CM

## 2018-10-12 DIAGNOSIS — E66.9 OBESITY (BMI 30-39.9): ICD-10-CM

## 2018-10-12 DIAGNOSIS — E78.2 MIXED HYPERLIPIDEMIA: Primary | ICD-10-CM

## 2018-10-12 PROBLEM — R73.01 IFG (IMPAIRED FASTING GLUCOSE): Status: RESOLVED | Noted: 2018-05-18 | Resolved: 2018-10-12

## 2018-10-12 PROCEDURE — 99214 OFFICE O/P EST MOD 30 MIN: CPT | Performed by: INTERNAL MEDICINE

## 2018-10-12 RX ORDER — TRIAMCINOLONE ACETONIDE 0.1 %
PASTE (GRAM) DENTAL
COMMUNITY
Start: 2018-10-06 | End: 2019-02-15

## 2018-10-12 RX ORDER — GUAIFENESIN AND CODEINE PHOSPHATE 100; 10 MG/5ML; MG/5ML
5 SOLUTION ORAL 3 TIMES DAILY PRN
Qty: 118 ML | Refills: 0 | Status: SHIPPED | OUTPATIENT
Start: 2018-10-12 | End: 2019-02-15

## 2018-10-12 RX ORDER — GUAIFENESIN AND CODEINE PHOSPHATE 100; 10 MG/5ML; MG/5ML
5 SOLUTION ORAL 3 TIMES DAILY PRN
Qty: 118 ML | Refills: 0 | Status: SHIPPED | OUTPATIENT
Start: 2018-10-12 | End: 2018-10-12 | Stop reason: SDUPTHER

## 2018-10-12 RX ORDER — AZITHROMYCIN 250 MG/1
TABLET, FILM COATED ORAL
Qty: 6 TABLET | Refills: 0 | Status: SHIPPED | OUTPATIENT
Start: 2018-10-12 | End: 2019-01-18

## 2018-10-12 NOTE — PROGRESS NOTES
Manuel Bellamy is a 43 y.o. male, who presents with a chief complaint of   Chief Complaint   Patient presents with   • Follow-up   • Hyperlipidemia       42 yo F here for follow up and doing well.     He has had cough and congestion for 3 weeks. It was clear and is now yellow/green sputum. He does feel slightly SOB if he exerts himself. No sick contacts. No fever.  Unsure if PND. No sore throat. He is taking Singulair and Claritin for allergies.     He has done well on on the Celexa. Taking 10mg one pill every 3 days. He feels well and wants to continue to wean.     He has HLD that is doing some better. Trying to exercise and eat better. TG's are down more than LDL/          The following portions of the patient's history were reviewed and updated as appropriate: allergies, current medications, past family history, past medical history, past social history, past surgical history and problem list.    Allergies: Tessalon [benzonatate]    Current Outpatient Prescriptions:   •  budesonide-formoterol (SYMBICORT) 160-4.5 MCG/ACT inhaler, Inhale 2 puffs 2 (Two) Times a Day., Disp: 1 inhaler, Rfl: 3  •  citalopram (CeleXA) 20 MG tablet, Take 0.5 tablets by mouth Daily. (Patient taking differently: Take 10 mg by mouth Daily. Every other day), Disp: 30 tablet, Rfl: 3  •  dextromethorphan polistirex ER (DELSYM) 30 MG/5ML Suspension Extended Release oral suspension, Take  by mouth., Disp: , Rfl:   •  Loratadine 10 MG capsule, Take 1 each by mouth Daily., Disp: 90 each, Rfl: 1  •  melatonin 5 MG tablet tablet, Take 5 mg by mouth., Disp: , Rfl:   •  mometasone (NASONEX) 50 MCG/ACT nasal spray, 2 sprays into the nostril(s) as directed by provider Daily., Disp: , Rfl:   •  montelukast (SINGULAIR) 10 MG tablet, Take 1 tablet by mouth Every Night., Disp: 90 tablet, Rfl: 1  •  albuterol (PROVENTIL HFA;VENTOLIN HFA) 108 (90 Base) MCG/ACT inhaler, Inhale 2 puffs Every 4 (Four) Hours As Needed for Wheezing or Shortness of Air.,  "Disp: 1 inhaler, Rfl: 1  •  azithromycin (ZITHROMAX Z-JOSEPH) 250 MG tablet, Take 2 tablets the first day, then 1 tablet daily for 4 days., Disp: 6 tablet, Rfl: 0  •  guaifenesin-codeine (GUAIFENESIN AC) 100-10 MG/5ML liquid, Take 5 mL by mouth 3 (Three) Times a Day As Needed for Cough., Disp: 118 mL, Rfl: 0  •  triamcinolone (KENALOG) 0.1 % paste, , Disp: , Rfl:   Medications Discontinued During This Encounter   Medication Reason   • guaifenesin-codeine (GUAIFENESIN AC) 100-10 MG/5ML liquid Reorder       Review of Systems   Constitutional: Positive for fatigue. Negative for chills and fever.   HENT: Positive for congestion and sinus pressure.    Respiratory: Positive for cough. Negative for shortness of breath.    Gastrointestinal: Negative for abdominal pain, diarrhea, nausea and vomiting.   Genitourinary: Negative for difficulty urinating and dysuria.   Skin: Negative for rash.             /82   Pulse 81   Temp 97.9 °F (36.6 °C) (Oral)   Resp 16   Ht 182.9 cm (72\")   Wt 102 kg (224 lb)   SpO2 99%   BMI 30.38 kg/m²       Physical Exam   Constitutional: He is oriented to person, place, and time. He appears well-developed and well-nourished. No distress.   HENT:   Head: Normocephalic and atraumatic.   Right Ear: Hearing, tympanic membrane, external ear and ear canal normal.   Left Ear: Hearing, tympanic membrane, external ear and ear canal normal.   Nose: Nose normal.   Mouth/Throat: Uvula is midline and mucous membranes are normal. Posterior oropharyngeal edema and posterior oropharyngeal erythema present. No oropharyngeal exudate. Tonsils are 0 on the right. Tonsils are 0 on the left. No tonsillar exudate.   Eyes: Conjunctivae are normal. Right eye exhibits no discharge. Left eye exhibits no discharge. No scleral icterus.   Neck: Neck supple.   Cardiovascular: Normal rate, regular rhythm and normal heart sounds.  Exam reveals no gallop and no friction rub.    No murmur heard.  Pulmonary/Chest: Effort " normal and breath sounds normal. No respiratory distress. He has no wheezes. He has no rales.   Lymphadenopathy:     He has no cervical adenopathy.   Neurological: He is alert and oriented to person, place, and time.   Skin: Skin is warm. Capillary refill takes less than 2 seconds. No rash noted.   Psychiatric: He has a normal mood and affect. His behavior is normal.   Nursing note and vitals reviewed.        Results for orders placed or performed in visit on 09/17/18   Comprehensive Metabolic Panel   Result Value Ref Range    Glucose 97 65 - 99 mg/dL    BUN 16 6 - 20 mg/dL    Creatinine 0.81 0.76 - 1.27 mg/dL    eGFR Non African Am 104 >60 mL/min/1.73    eGFR African Am 125 >60 mL/min/1.73    BUN/Creatinine Ratio 19.8 7.0 - 25.0    Sodium 139 136 - 145 mmol/L    Potassium 4.7 3.5 - 5.2 mmol/L    Chloride 101 98 - 107 mmol/L    Total CO2 25.9 22.0 - 29.0 mmol/L    Calcium 9.2 8.6 - 10.5 mg/dL    Total Protein 7.1 6.0 - 8.5 g/dL    Albumin 4.30 3.50 - 5.20 g/dL    Globulin 2.8 gm/dL    A/G Ratio 1.5 g/dL    Total Bilirubin 0.4 0.2 - 1.2 mg/dL    Alkaline Phosphatase 91 40 - 129 U/L    AST (SGOT) 21 5 - 40 U/L    ALT (SGPT) 29 5 - 41 U/L   Hemoglobin A1c   Result Value Ref Range    Hemoglobin A1C 5.40 4.80 - 5.60 %   Lipid Panel With LDL / HDL Ratio   Result Value Ref Range    Total Cholesterol 199 0 - 200 mg/dL    Triglycerides 193 (H) 0 - 150 mg/dL    HDL Cholesterol 41 40 - 60 mg/dL    VLDL Cholesterol 38.6 (H) 8 - 32 mg/dL    LDL Cholesterol  119 (H) 0 - 100 mg/dL    LDL/HDL Ratio 2.91    Urinalysis With / Culture If Indicated - Urine, Clean Catch   Result Value Ref Range    Specific Gravity, UA 1.026 1.005 - 1.030    pH, UA 6.5 5.0 - 7.5    Color, UA Yellow Yellow    Appearance, UA Clear Clear    Leukocytes, UA Negative Negative    Protein Negative Negative/Trace    Glucose, UA Negative Negative    Ketones Negative Negative    Blood, UA Negative Negative    Bilirubin, UA Negative Negative    Urobilinogen, UA 0.2  0.2 - 1.0 mg/dL    Nitrite, UA Negative Negative    Microscopic Examination Comment     MICROSCOPIC EXAMINATION See below:     Urinalysis Reflex Comment    Microscopic Examination   Result Value Ref Range    WBC, UA 0-5 0 - 5 /hpf    RBC, UA 0-2 0 - 2 /hpf    Epithelial Cells (non renal) None seen 0 - 10 /hpf    Mucus, UA Present Not Estab. /hpf    Bacteria, UA None seen None seen/Few /hpf   CBC & Differential   Result Value Ref Range    WBC 6.06 4.80 - 10.80 10*3/mm3    RBC 4.67 (L) 4.70 - 6.10 10*6/mm3    Hemoglobin 15.5 14.0 - 18.0 g/dL    Hematocrit 43.5 42.0 - 52.0 %    MCV 93.1 80.0 - 94.0 fL    MCH 33.2 (H) 27.0 - 31.0 pg    MCHC 35.6 31.0 - 37.0 g/dL    RDW 12.8 11.5 - 14.5 %    Platelets 246 140 - 500 10*3/mm3    Neutrophil Rel % 49.7 45.0 - 70.0 %    Lymphocyte Rel % 36.0 20.0 - 45.0 %    Monocyte Rel % 9.1 (H) 3.0 - 8.0 %    Eosinophil Rel % 4.3 (H) 0.0 - 4.0 %    Basophil Rel % 0.7 0.0 - 2.0 %    Neutrophils Absolute 3.02 1.50 - 8.30 10*3/mm3    Lymphocytes Absolute 2.18 0.60 - 4.80 10*3/mm3    Monocytes Absolute 0.55 0.00 - 1.00 10*3/mm3    Eosinophils Absolute 0.26 0.10 - 0.30 10*3/mm3    Basophils Absolute 0.04 0.00 - 0.20 10*3/mm3    Immature Granulocyte Rel % 0.2 0.0 - 0.5 %    Immature Grans Absolute 0.01 0.00 - 0.03 10*3/mm3    nRBC 0.0 0.0 - 0.0 /100 WBC           Manuel was seen today for follow-up and hyperlipidemia.    Diagnoses and all orders for this visit:    Mixed hyperlipidemia    Chronic seasonal allergic rhinitis    Obesity (BMI 30-39.9)    Depression, unspecified depression type    Anxiety    Acute bronchitis, unspecified organism    Other orders  -     azithromycin (ZITHROMAX Z-JOSEPH) 250 MG tablet; Take 2 tablets the first day, then 1 tablet daily for 4 days.  -     Discontinue: guaifenesin-codeine (GUAIFENESIN AC) 100-10 MG/5ML liquid; Take 5 mL by mouth 3 (Three) Times a Day As Needed for Cough.  -     guaifenesin-codeine (GUAIFENESIN AC) 100-10 MG/5ML liquid; Take 5 mL by mouth 3  (Three) Times a Day As Needed for Cough.        Treat bronchitis with Z-pack and cough syrup     Keep watching HLD. Counseled on continued exercise and diet. Recheck in 6 months when he has physical.     Can stop Celexa when he is ready     Continue allergy medication as directed.   Return in about 6 months (around 4/12/2019) for Annual physical.    Idalmis Whitehead MD  10/12/2018

## 2018-11-20 ENCOUNTER — CLINICAL SUPPORT (OUTPATIENT)
Dept: INTERNAL MEDICINE | Facility: CLINIC | Age: 43
End: 2018-11-20

## 2018-11-20 DIAGNOSIS — Z23 IMMUNIZATION DUE: Primary | ICD-10-CM

## 2018-11-20 PROCEDURE — 90632 HEPA VACCINE ADULT IM: CPT | Performed by: INTERNAL MEDICINE

## 2018-11-20 PROCEDURE — 90471 IMMUNIZATION ADMIN: CPT | Performed by: INTERNAL MEDICINE

## 2019-01-18 RX ORDER — CITALOPRAM 20 MG/1
TABLET ORAL
Qty: 90 TABLET | Refills: 1 | Status: SHIPPED | OUTPATIENT
Start: 2019-01-18 | End: 2019-05-10 | Stop reason: SDUPTHER

## 2019-02-15 ENCOUNTER — OFFICE VISIT (OUTPATIENT)
Dept: INTERNAL MEDICINE | Facility: CLINIC | Age: 44
End: 2019-02-15

## 2019-02-15 VITALS
RESPIRATION RATE: 16 BRPM | OXYGEN SATURATION: 100 % | HEIGHT: 72 IN | HEART RATE: 68 BPM | TEMPERATURE: 97.8 F | WEIGHT: 228 LBS | BODY MASS INDEX: 30.88 KG/M2 | DIASTOLIC BLOOD PRESSURE: 78 MMHG | SYSTOLIC BLOOD PRESSURE: 126 MMHG

## 2019-02-15 DIAGNOSIS — J06.9 VIRAL UPPER RESPIRATORY TRACT INFECTION: Primary | ICD-10-CM

## 2019-02-15 PROCEDURE — 99213 OFFICE O/P EST LOW 20 MIN: CPT | Performed by: INTERNAL MEDICINE

## 2019-02-15 NOTE — PROGRESS NOTES
Manuel Bellamy is a 44 y.o. male, who presents with a chief complaint of   Chief Complaint   Patient presents with   • Nasal Congestion     all sx 5 x days   • Cough   • Sore Throat   • Chills       45 yo M here with 5 days of congestion, sore throat, cough and ear fullness. He has had chills and felt tired. No sick contacts. No fever. NiQuil at night to help him sleep.          The following portions of the patient's history were reviewed and updated as appropriate: allergies, current medications, past family history, past medical history, past social history, past surgical history and problem list.    Allergies: Tessalon [benzonatate]    Current Outpatient Medications:   •  citalopram (CeleXA) 20 MG tablet, TAKE ONE-HALF TABLET BY MOUTH ONCE DAILY, Disp: 90 tablet, Rfl: 1  •  melatonin 5 MG tablet tablet, Take 5 mg by mouth., Disp: , Rfl:   •  mometasone (NASONEX) 50 MCG/ACT nasal spray, 2 sprays into the nostril(s) as directed by provider Daily., Disp: , Rfl:   •  montelukast (SINGULAIR) 10 MG tablet, Take 1 tablet by mouth Every Night., Disp: 90 tablet, Rfl: 1  •  HYDROcodone-homatropine (HYCODAN) 5-1.5 MG/5ML syrup, Take 5 mL by mouth Every 6 (Six) Hours As Needed for Cough., Disp: 120 mL, Rfl: 0  Medications Discontinued During This Encounter   Medication Reason   • albuterol (PROVENTIL HFA;VENTOLIN HFA) 108 (90 Base) MCG/ACT inhaler *Therapy completed   • guaifenesin-codeine (GUAIFENESIN AC) 100-10 MG/5ML liquid *Therapy completed   • dextromethorphan polistirex ER (DELSYM) 30 MG/5ML Suspension Extended Release oral suspension *Therapy completed   • budesonide-formoterol (SYMBICORT) 160-4.5 MCG/ACT inhaler *Therapy completed   • Loratadine 10 MG capsule *Therapy completed   • triamcinolone (KENALOG) 0.1 % paste *Therapy completed   • HYDROcodone-homatropine (HYCODAN) 5-1.5 MG/5ML syrup Reorder       Review of Systems   Constitutional: Positive for chills and fatigue.   HENT: Positive for congestion,  "rhinorrhea and sinus pressure. Negative for tinnitus.    Respiratory: Positive for cough. Negative for shortness of breath.    Gastrointestinal: Negative for nausea and vomiting.             /78 (BP Location: Left arm, Patient Position: Sitting, Cuff Size: Adult)   Pulse 68   Temp 97.8 °F (36.6 °C) (Oral)   Resp 16   Ht 182.9 cm (72\")   Wt 103 kg (228 lb)   SpO2 100%   BMI 30.92 kg/m²       Physical Exam   Constitutional: He is oriented to person, place, and time. He appears well-developed and well-nourished. No distress.   HENT:   Head: Normocephalic and atraumatic.   Right Ear: External ear normal.   Left Ear: External ear normal.   Mouth/Throat: Oropharynx is clear and moist. No oropharyngeal exudate.   Eyes: Conjunctivae are normal. Right eye exhibits no discharge. Left eye exhibits no discharge. No scleral icterus.   Neck: Neck supple.   Cardiovascular: Normal rate, regular rhythm and normal heart sounds. Exam reveals no gallop and no friction rub.   No murmur heard.  Pulmonary/Chest: Effort normal and breath sounds normal. No respiratory distress. He has no wheezes. He has no rales.   Lymphadenopathy:     He has no cervical adenopathy.   Neurological: He is alert and oriented to person, place, and time.   Skin: Skin is warm. No rash noted.   Psychiatric: He has a normal mood and affect. His behavior is normal.   Nursing note and vitals reviewed.        Results for orders placed or performed in visit on 09/17/18   Comprehensive Metabolic Panel   Result Value Ref Range    Glucose 97 65 - 99 mg/dL    BUN 16 6 - 20 mg/dL    Creatinine 0.81 0.76 - 1.27 mg/dL    eGFR Non African Am 104 >60 mL/min/1.73    eGFR African Am 125 >60 mL/min/1.73    BUN/Creatinine Ratio 19.8 7.0 - 25.0    Sodium 139 136 - 145 mmol/L    Potassium 4.7 3.5 - 5.2 mmol/L    Chloride 101 98 - 107 mmol/L    Total CO2 25.9 22.0 - 29.0 mmol/L    Calcium 9.2 8.6 - 10.5 mg/dL    Total Protein 7.1 6.0 - 8.5 g/dL    Albumin 4.30 3.50 - " 5.20 g/dL    Globulin 2.8 gm/dL    A/G Ratio 1.5 g/dL    Total Bilirubin 0.4 0.2 - 1.2 mg/dL    Alkaline Phosphatase 91 40 - 129 U/L    AST (SGOT) 21 5 - 40 U/L    ALT (SGPT) 29 5 - 41 U/L   Hemoglobin A1c   Result Value Ref Range    Hemoglobin A1C 5.40 4.80 - 5.60 %   Lipid Panel With LDL / HDL Ratio   Result Value Ref Range    Total Cholesterol 199 0 - 200 mg/dL    Triglycerides 193 (H) 0 - 150 mg/dL    HDL Cholesterol 41 40 - 60 mg/dL    VLDL Cholesterol 38.6 (H) 8 - 32 mg/dL    LDL Cholesterol  119 (H) 0 - 100 mg/dL    LDL/HDL Ratio 2.91    Urinalysis With / Culture If Indicated - Urine, Clean Catch   Result Value Ref Range    Specific Gravity, UA 1.026 1.005 - 1.030    pH, UA 6.5 5.0 - 7.5    Color, UA Yellow Yellow    Appearance, UA Clear Clear    Leukocytes, UA Negative Negative    Protein Negative Negative/Trace    Glucose, UA Negative Negative    Ketones Negative Negative    Blood, UA Negative Negative    Bilirubin, UA Negative Negative    Urobilinogen, UA 0.2 0.2 - 1.0 mg/dL    Nitrite, UA Negative Negative    Microscopic Examination Comment     Microscopic Examination See below:     Urinalysis Reflex Comment    Microscopic Examination   Result Value Ref Range    WBC, UA 0-5 0 - 5 /hpf    RBC, UA 0-2 0 - 2 /hpf    Epithelial Cells (non renal) None seen 0 - 10 /hpf    Mucus, UA Present Not Estab. /hpf    Bacteria, UA None seen None seen/Few /hpf   CBC & Differential   Result Value Ref Range    WBC 6.06 4.80 - 10.80 10*3/mm3    RBC 4.67 (L) 4.70 - 6.10 10*6/mm3    Hemoglobin 15.5 14.0 - 18.0 g/dL    Hematocrit 43.5 42.0 - 52.0 %    MCV 93.1 80.0 - 94.0 fL    MCH 33.2 (H) 27.0 - 31.0 pg    MCHC 35.6 31.0 - 37.0 g/dL    RDW 12.8 11.5 - 14.5 %    Platelets 246 140 - 500 10*3/mm3    Neutrophil Rel % 49.7 45.0 - 70.0 %    Lymphocyte Rel % 36.0 20.0 - 45.0 %    Monocyte Rel % 9.1 (H) 3.0 - 8.0 %    Eosinophil Rel % 4.3 (H) 0.0 - 4.0 %    Basophil Rel % 0.7 0.0 - 2.0 %    Neutrophils Absolute 3.02 1.50 - 8.30  10*3/mm3    Lymphocytes Absolute 2.18 0.60 - 4.80 10*3/mm3    Monocytes Absolute 0.55 0.00 - 1.00 10*3/mm3    Eosinophils Absolute 0.26 0.10 - 0.30 10*3/mm3    Basophils Absolute 0.04 0.00 - 0.20 10*3/mm3    Immature Granulocyte Rel % 0.2 0.0 - 0.5 %    Immature Grans Absolute 0.01 0.00 - 0.03 10*3/mm3    nRBC 0.0 0.0 - 0.0 /100 WBC           Manuel was seen today for nasal congestion, cough, sore throat and chills.    Diagnoses and all orders for this visit:    Viral upper respiratory tract infection    Other orders  -     Discontinue: HYDROcodone-homatropine (HYCODAN) 5-1.5 MG/5ML syrup; Take 5 mL by mouth Every 6 (Six) Hours As Needed for Cough.  -     HYDROcodone-homatropine (HYCODAN) 5-1.5 MG/5ML syrup; Take 5 mL by mouth Every 6 (Six) Hours As Needed for Cough.      Viral URI  Supportive care and take Tylenol cold and sinus during day and cough syrup at night   Drink plenty of fluids and rest   If not better in 5-7 days, will give antibiotic to treat bronchitis     Return if symptoms worsen or fail to improve.    Idalmis Whitehead MD  02/15/2019

## 2019-04-25 DIAGNOSIS — R73.01 IFG (IMPAIRED FASTING GLUCOSE): ICD-10-CM

## 2019-04-25 DIAGNOSIS — Z00.00 ROUTINE ADULT HEALTH MAINTENANCE: Primary | ICD-10-CM

## 2019-04-25 DIAGNOSIS — E78.2 MIXED HYPERLIPIDEMIA: ICD-10-CM

## 2019-04-25 DIAGNOSIS — E55.9 VITAMIN D DEFICIENCY: ICD-10-CM

## 2019-04-26 LAB
ALBUMIN SERPL-MCNC: 4.8 G/DL (ref 3.5–5.2)
ALBUMIN/GLOB SERPL: 1.7 G/DL
ALP SERPL-CCNC: 80 U/L (ref 39–117)
ALT SERPL-CCNC: 30 U/L (ref 1–41)
AST SERPL-CCNC: 20 U/L (ref 1–40)
BASOPHILS # BLD AUTO: 0.04 10*3/MM3 (ref 0–0.2)
BASOPHILS NFR BLD AUTO: 0.7 % (ref 0–1.5)
BILIRUB SERPL-MCNC: 0.5 MG/DL (ref 0.2–1.2)
BUN SERPL-MCNC: 16 MG/DL (ref 6–20)
BUN/CREAT SERPL: 19.5 (ref 7–25)
CALCIUM SERPL-MCNC: 10 MG/DL (ref 8.6–10.5)
CHLORIDE SERPL-SCNC: 99 MMOL/L (ref 98–107)
CHOLEST SERPL-MCNC: 227 MG/DL (ref 0–200)
CO2 SERPL-SCNC: 26.1 MMOL/L (ref 22–29)
CREAT SERPL-MCNC: 0.82 MG/DL (ref 0.76–1.27)
EOSINOPHIL # BLD AUTO: 0.19 10*3/MM3 (ref 0–0.4)
EOSINOPHIL NFR BLD AUTO: 3.4 % (ref 0.3–6.2)
ERYTHROCYTE [DISTWIDTH] IN BLOOD BY AUTOMATED COUNT: 12.3 % (ref 12.3–15.4)
GLOBULIN SER CALC-MCNC: 2.9 GM/DL
GLUCOSE SERPL-MCNC: 100 MG/DL (ref 65–99)
HCT VFR BLD AUTO: 47.2 % (ref 37.5–51)
HDLC SERPL-MCNC: 42 MG/DL (ref 40–60)
HGB BLD-MCNC: 16.1 G/DL (ref 13–17.7)
IMM GRANULOCYTES # BLD AUTO: 0.02 10*3/MM3 (ref 0–0.05)
IMM GRANULOCYTES NFR BLD AUTO: 0.4 % (ref 0–0.5)
LDLC SERPL CALC-MCNC: 139 MG/DL (ref 0–100)
LDLC/HDLC SERPL: 3.3 {RATIO}
LYMPHOCYTES # BLD AUTO: 2.12 10*3/MM3 (ref 0.7–3.1)
LYMPHOCYTES NFR BLD AUTO: 37.5 % (ref 19.6–45.3)
MCH RBC QN AUTO: 31.4 PG (ref 26.6–33)
MCHC RBC AUTO-ENTMCNC: 34.1 G/DL (ref 31.5–35.7)
MCV RBC AUTO: 92 FL (ref 79–97)
MONOCYTES # BLD AUTO: 0.48 10*3/MM3 (ref 0.1–0.9)
MONOCYTES NFR BLD AUTO: 8.5 % (ref 5–12)
NEUTROPHILS # BLD AUTO: 2.81 10*3/MM3 (ref 1.7–7)
NEUTROPHILS NFR BLD AUTO: 49.5 % (ref 42.7–76)
NRBC BLD AUTO-RTO: 0 /100 WBC (ref 0–0.2)
PLATELET # BLD AUTO: 262 10*3/MM3 (ref 140–450)
POTASSIUM SERPL-SCNC: 4.5 MMOL/L (ref 3.5–5.2)
PROT SERPL-MCNC: 7.7 G/DL (ref 6–8.5)
RBC # BLD AUTO: 5.13 10*6/MM3 (ref 4.14–5.8)
SODIUM SERPL-SCNC: 137 MMOL/L (ref 136–145)
TRIGL SERPL-MCNC: 232 MG/DL (ref 0–150)
VLDLC SERPL CALC-MCNC: 46.4 MG/DL
WBC # BLD AUTO: 5.66 10*3/MM3 (ref 3.4–10.8)

## 2019-04-30 ENCOUNTER — RESULTS ENCOUNTER (OUTPATIENT)
Dept: INTERNAL MEDICINE | Facility: CLINIC | Age: 44
End: 2019-04-30

## 2019-04-30 DIAGNOSIS — E55.9 VITAMIN D DEFICIENCY: ICD-10-CM

## 2019-04-30 DIAGNOSIS — R73.01 IFG (IMPAIRED FASTING GLUCOSE): ICD-10-CM

## 2019-04-30 DIAGNOSIS — E78.2 MIXED HYPERLIPIDEMIA: ICD-10-CM

## 2019-04-30 DIAGNOSIS — Z00.00 ROUTINE ADULT HEALTH MAINTENANCE: ICD-10-CM

## 2019-05-10 ENCOUNTER — OFFICE VISIT (OUTPATIENT)
Dept: INTERNAL MEDICINE | Facility: CLINIC | Age: 44
End: 2019-05-10

## 2019-05-10 VITALS
TEMPERATURE: 98.8 F | BODY MASS INDEX: 30.2 KG/M2 | SYSTOLIC BLOOD PRESSURE: 126 MMHG | RESPIRATION RATE: 14 BRPM | HEIGHT: 72 IN | WEIGHT: 223 LBS | DIASTOLIC BLOOD PRESSURE: 80 MMHG | OXYGEN SATURATION: 98 % | HEART RATE: 94 BPM

## 2019-05-10 DIAGNOSIS — J30.1 CHRONIC SEASONAL ALLERGIC RHINITIS DUE TO POLLEN: ICD-10-CM

## 2019-05-10 DIAGNOSIS — E66.9 OBESITY (BMI 30-39.9): ICD-10-CM

## 2019-05-10 DIAGNOSIS — Z00.00 ENCOUNTER FOR ANNUAL PHYSICAL EXAM: Primary | ICD-10-CM

## 2019-05-10 DIAGNOSIS — F32.A DEPRESSION, UNSPECIFIED DEPRESSION TYPE: ICD-10-CM

## 2019-05-10 DIAGNOSIS — E78.2 MIXED HYPERLIPIDEMIA: ICD-10-CM

## 2019-05-10 PROCEDURE — 99396 PREV VISIT EST AGE 40-64: CPT | Performed by: INTERNAL MEDICINE

## 2019-05-10 RX ORDER — MONTELUKAST SODIUM 10 MG/1
10 TABLET ORAL NIGHTLY
Qty: 90 TABLET | Refills: 3 | Status: SHIPPED | OUTPATIENT
Start: 2019-05-10 | End: 2020-06-12

## 2019-05-10 RX ORDER — CITALOPRAM 10 MG/1
10 TABLET ORAL DAILY
Qty: 90 TABLET | Refills: 3 | Status: SHIPPED | OUTPATIENT
Start: 2019-05-10 | End: 2019-09-26 | Stop reason: SDUPTHER

## 2019-05-10 NOTE — PROGRESS NOTES
Patient Name: Manuel Bellamy    SUBJECTIVE    Manuel is a 44 y.o. male presenting for Annual Exam (CPE, lab results)    He is doing well and has lost some weight. He is busy with his sons. He is feeling well. He is eating fair. He is eating more carbs and greasy foods. His cholesterol is elevated particularly with TG's and LDL. No family history of heart disease.     He is doing well on Celexa is tolerating well. Feels that he wants to stay on this. Takes 10mg every other night.     Well Adult Physical   Patient here for a comprehensive physical exam.The patient reports problems - anxiety/depression    Do you take any herbs or supplements that were not prescribed by a doctor? no   Are you taking calcium supplements? no   Are you taking aspirin daily? no    Manuel Bellamy 44 y.o. male who presents for an Annual Wellness Visit.  he has a history of   Patient Active Problem List   Diagnosis   • Chronic seasonal allergic rhinitis   • Depression   • Psychophysiological insomnia   • Vitamin D deficiency   • Anxiety   • Mixed hyperlipidemia   • Orthostatic hypotension   • Chronic bilateral low back pain without sciatica   • Obesity (BMI 30-39.9)        Health Habits:  Dental Exam. up to date  Eye Exam. not up to date - needs to schedule  Exercise: 0 times/week.  Current exercise activities include: nothing regular     Prostate cancer screening: will start checking at 50 years   Colonoscopy: will screen at 50 years of age  Tob use:N/A   Qualifies for lung Ca screening?N/A       The following portions of the patient's history were reviewed and updated as appropriate: allergies, current medications, past family history, past medical history, past social history, past surgical history and problem list.    Review of Systems   Constitutional: Negative for chills and fatigue.   HENT: Negative for congestion and rhinorrhea.    Respiratory: Negative for cough and shortness of breath.    Cardiovascular: Negative for chest  "pain and palpitations.   Gastrointestinal: Negative for abdominal pain, diarrhea and nausea.   Allergic/Immunologic: Positive for environmental allergies.       Tessalon [benzonatate]      Current Outpatient Medications:   •  citalopram (CeleXA) 10 MG tablet, Take 1 tablet by mouth Daily., Disp: 90 tablet, Rfl: 3  •  melatonin 5 MG tablet tablet, Take 5 mg by mouth., Disp: , Rfl:   •  mometasone (NASONEX) 50 MCG/ACT nasal spray, 2 sprays into the nostril(s) as directed by provider Daily., Disp: , Rfl:   •  montelukast (SINGULAIR) 10 MG tablet, Take 1 tablet by mouth Every Night., Disp: 90 tablet, Rfl: 3    OBJECTIVE    /80 (BP Location: Left arm, Patient Position: Sitting, Cuff Size: Large Adult)   Pulse 94   Temp 98.8 °F (37.1 °C) (Oral)   Resp 14   Ht 182.9 cm (72\")   Wt 101 kg (223 lb)   SpO2 98%   BMI 30.24 kg/m²     Physical Exam   Constitutional: He is oriented to person, place, and time. He appears well-developed and well-nourished. No distress.   HENT:   Head: Normocephalic and atraumatic.   Right Ear: External ear normal.   Left Ear: External ear normal.   Mouth/Throat: Oropharynx is clear and moist. No oropharyngeal exudate.   Eyes: Conjunctivae are normal. Right eye exhibits no discharge. Left eye exhibits no discharge. No scleral icterus.   Neck: Neck supple. Carotid bruit is not present. No thyroid mass and no thyromegaly present.   Cardiovascular: Normal rate, regular rhythm and normal heart sounds. Exam reveals no gallop and no friction rub.   No murmur heard.  Pulmonary/Chest: Effort normal and breath sounds normal. No respiratory distress. He has no wheezes. He has no rales.   Abdominal: Soft. Bowel sounds are normal. He exhibits no distension and no mass. There is no tenderness. There is no guarding.   Lymphadenopathy:     He has no cervical adenopathy.   Neurological: He is alert and oriented to person, place, and time.   Skin: Skin is warm. No rash noted.   Psychiatric: He has a " normal mood and affect. His behavior is normal.   Nursing note and vitals reviewed.      ASSESSMENT AND PLAN  Vaccines are up to date. I want Manuel to begin a progressive daily aerobic exercise program, follow a low fat, low cholesterol diet, attempt to lose weight, decrease or avoid alcohol intake, reduce salt in diet and cooking, reduce exposure to stress, continue current medications and return for routine annual checkups.     Will continue Celexa at current dose and see back in one year.     Will continue Singulair at current dose. Nasonex as needed.     Cholesterol is still high. He needs to keep working on diet and exercise. Repeat in one year.     Manuel was seen today for annual exam.    Diagnoses and all orders for this visit:    Encounter for annual physical exam    Mixed hyperlipidemia    Obesity (BMI 30-39.9)    Depression, unspecified depression type    Chronic seasonal allergic rhinitis due to pollen  -     montelukast (SINGULAIR) 10 MG tablet; Take 1 tablet by mouth Every Night.    Other orders  -     citalopram (CeleXA) 10 MG tablet; Take 1 tablet by mouth Daily.         Return in about 1 year (around 5/10/2020) for Annual physical.

## 2019-09-26 ENCOUNTER — OFFICE VISIT (OUTPATIENT)
Dept: INTERNAL MEDICINE | Facility: CLINIC | Age: 44
End: 2019-09-26

## 2019-09-26 VITALS
RESPIRATION RATE: 18 BRPM | OXYGEN SATURATION: 97 % | TEMPERATURE: 99.3 F | SYSTOLIC BLOOD PRESSURE: 122 MMHG | BODY MASS INDEX: 29.58 KG/M2 | WEIGHT: 218.4 LBS | DIASTOLIC BLOOD PRESSURE: 94 MMHG | HEIGHT: 72 IN | HEART RATE: 112 BPM

## 2019-09-26 DIAGNOSIS — J45.909 REACTIVE AIRWAY DISEASE WITHOUT COMPLICATION, UNSPECIFIED ASTHMA SEVERITY, UNSPECIFIED WHETHER PERSISTENT: ICD-10-CM

## 2019-09-26 DIAGNOSIS — F41.9 ANXIETY: Primary | ICD-10-CM

## 2019-09-26 DIAGNOSIS — F51.01 PRIMARY INSOMNIA: ICD-10-CM

## 2019-09-26 DIAGNOSIS — B37.0 THRUSH: ICD-10-CM

## 2019-09-26 PROCEDURE — 99214 OFFICE O/P EST MOD 30 MIN: CPT | Performed by: INTERNAL MEDICINE

## 2019-09-26 RX ORDER — TRAZODONE HYDROCHLORIDE 50 MG/1
50 TABLET ORAL NIGHTLY
Qty: 30 TABLET | Refills: 1 | Status: SHIPPED | OUTPATIENT
Start: 2019-09-26 | End: 2020-01-10

## 2019-09-26 RX ORDER — CITALOPRAM 20 MG/1
10 TABLET ORAL DAILY
Qty: 90 TABLET | Refills: 2 | Status: SHIPPED | OUTPATIENT
Start: 2019-09-26 | End: 2020-10-29

## 2019-09-26 NOTE — PATIENT INSTRUCTIONS
Can go up to 20mg on Celexa, but definitely  the 20mg prescription and start 1/2 of a pill once per day   Increase Melatonin to 10mg at night   Use Trazodone if you need it

## 2019-09-26 NOTE — PROGRESS NOTES
"      Manuel Bellamy is a 44 y.o. male, who presents with a chief complaint of   Chief Complaint   Patient presents with   • Thrush   • Sleeping Problem       45 yo M here for acute visit. He has been doing really well all summer. He has not been sleeping well for the last week though. His work has been \"crazy\" and he has been stressed. He does feel that this has changed his sleep. He gets really nervous and anxious about not sleeping. He has tried Melatonin that has helped some. He takes Celexa as well, but only 10mg every other day.  He feels that the 10mg pill is not working as good as 1/2 of 20mg pill. He had a nervous breakdown about 10 year ago and has needed Celexa since that time.     He started using Symbicort last week and throat has been sore.  He feels that in the fall, he tends to get SOB and have cough. Taking Zyrtec and Singulair as well. No fever and no increased WOB.          The following portions of the patient's history were reviewed and updated as appropriate: allergies, current medications, past family history, past medical history, past social history, past surgical history and problem list.    Allergies: Tessalon [benzonatate]    Current Outpatient Medications:   •  citalopram (CeleXA) 20 MG tablet, Take 0.5 tablets by mouth Daily., Disp: 90 tablet, Rfl: 2  •  melatonin 5 MG tablet tablet, Take 10 mg by mouth., Disp: , Rfl:   •  mometasone (NASONEX) 50 MCG/ACT nasal spray, 2 sprays into the nostril(s) as directed by provider Daily., Disp: , Rfl:   •  montelukast (SINGULAIR) 10 MG tablet, Take 1 tablet by mouth Every Night., Disp: 90 tablet, Rfl: 3  •  nystatin (MYCOSTATIN) 446019 UNIT/ML suspension, Swish and swallow 5 mL 3 (Three) Times a Day for 7 days., Disp: 105 mL, Rfl: 0  •  traZODone (DESYREL) 50 MG tablet, Take 1 tablet by mouth Every Night., Disp: 30 tablet, Rfl: 1  Medications Discontinued During This Encounter   Medication Reason   • citalopram (CeleXA) 10 MG tablet Reorder " "      Review of Systems   Constitutional: Positive for fatigue. Negative for chills.   HENT: Positive for congestion. Negative for rhinorrhea.    Respiratory: Positive for cough. Negative for shortness of breath.    Skin: Negative for rash.   Psychiatric/Behavioral: Positive for sleep disturbance. The patient is nervous/anxious.              /94 (BP Location: Left arm, Patient Position: Sitting, Cuff Size: Adult)   Pulse 112   Temp 99.3 °F (37.4 °C) (Oral)   Resp 18   Ht 182.9 cm (72\")   Wt 99.1 kg (218 lb 6.4 oz)   SpO2 97%   BMI 29.62 kg/m²       Physical Exam   Constitutional: He is oriented to person, place, and time. He appears well-developed and well-nourished. No distress.   HENT:   Head: Normocephalic and atraumatic.   Right Ear: Hearing, tympanic membrane, external ear and ear canal normal.   Left Ear: Hearing, tympanic membrane, external ear and ear canal normal.   Nose: Nose normal.   Mouth/Throat: Uvula is midline, oropharynx is clear and moist and mucous membranes are normal. No oropharyngeal exudate, posterior oropharyngeal edema or posterior oropharyngeal erythema. Tonsils are 0 on the right. Tonsils are 0 on the left. No tonsillar exudate.   Eyes: Conjunctivae are normal. Right eye exhibits no discharge. Left eye exhibits no discharge. No scleral icterus.   Neck: Neck supple.   Cardiovascular: Normal rate, regular rhythm, normal heart sounds and intact distal pulses. Exam reveals no gallop and no friction rub.   No murmur heard.  Pulmonary/Chest: Effort normal and breath sounds normal. No respiratory distress. He has no wheezes. He has no rales.   Lymphadenopathy:     He has no cervical adenopathy.   Neurological: He is alert and oriented to person, place, and time. He exhibits normal muscle tone. Coordination normal.   Skin: Skin is warm. No rash noted.   Psychiatric: He has a normal mood and affect. His behavior is normal.   Nursing note and vitals reviewed.        Results for orders " placed or performed in visit on 09/17/18   Comprehensive Metabolic Panel   Result Value Ref Range    Glucose 97 65 - 99 mg/dL    BUN 16 6 - 20 mg/dL    Creatinine 0.81 0.76 - 1.27 mg/dL    eGFR Non African Am 104 >60 mL/min/1.73    eGFR African Am 125 >60 mL/min/1.73    BUN/Creatinine Ratio 19.8 7.0 - 25.0    Sodium 139 136 - 145 mmol/L    Potassium 4.7 3.5 - 5.2 mmol/L    Chloride 101 98 - 107 mmol/L    Total CO2 25.9 22.0 - 29.0 mmol/L    Calcium 9.2 8.6 - 10.5 mg/dL    Total Protein 7.1 6.0 - 8.5 g/dL    Albumin 4.30 3.50 - 5.20 g/dL    Globulin 2.8 gm/dL    A/G Ratio 1.5 g/dL    Total Bilirubin 0.4 0.2 - 1.2 mg/dL    Alkaline Phosphatase 91 40 - 129 U/L    AST (SGOT) 21 5 - 40 U/L    ALT (SGPT) 29 5 - 41 U/L   Hemoglobin A1c   Result Value Ref Range    Hemoglobin A1C 5.40 4.80 - 5.60 %   Lipid Panel With LDL / HDL Ratio   Result Value Ref Range    Total Cholesterol 199 0 - 200 mg/dL    Triglycerides 193 (H) 0 - 150 mg/dL    HDL Cholesterol 41 40 - 60 mg/dL    VLDL Cholesterol 38.6 (H) 8 - 32 mg/dL    LDL Cholesterol  119 (H) 0 - 100 mg/dL    LDL/HDL Ratio 2.91    Urinalysis With / Culture If Indicated - Urine, Clean Catch   Result Value Ref Range    Specific Gravity, UA 1.026 1.005 - 1.030    pH, UA 6.5 5.0 - 7.5    Color, UA Yellow Yellow    Appearance, UA Clear Clear    Leukocytes, UA Negative Negative    Protein Negative Negative/Trace    Glucose, UA Negative Negative    Ketones Negative Negative    Blood, UA Negative Negative    Bilirubin, UA Negative Negative    Urobilinogen, UA 0.2 0.2 - 1.0 mg/dL    Nitrite, UA Negative Negative    Microscopic Examination Comment     Microscopic Examination See below:     Urinalysis Reflex Comment    Microscopic Examination   Result Value Ref Range    WBC, UA 0-5 0 - 5 /hpf    RBC, UA 0-2 0 - 2 /hpf    Epithelial Cells (non renal) None seen 0 - 10 /hpf    Mucus, UA Present Not Estab. /hpf    Bacteria, UA None seen None seen/Few /hpf   CBC & Differential   Result Value Ref  Range    WBC 6.06 4.80 - 10.80 10*3/mm3    RBC 4.67 (L) 4.70 - 6.10 10*6/mm3    Hemoglobin 15.5 14.0 - 18.0 g/dL    Hematocrit 43.5 42.0 - 52.0 %    MCV 93.1 80.0 - 94.0 fL    MCH 33.2 (H) 27.0 - 31.0 pg    MCHC 35.6 31.0 - 37.0 g/dL    RDW 12.8 11.5 - 14.5 %    Platelets 246 140 - 500 10*3/mm3    Neutrophil Rel % 49.7 45.0 - 70.0 %    Lymphocyte Rel % 36.0 20.0 - 45.0 %    Monocyte Rel % 9.1 (H) 3.0 - 8.0 %    Eosinophil Rel % 4.3 (H) 0.0 - 4.0 %    Basophil Rel % 0.7 0.0 - 2.0 %    Neutrophils Absolute 3.02 1.50 - 8.30 10*3/mm3    Lymphocytes Absolute 2.18 0.60 - 4.80 10*3/mm3    Monocytes Absolute 0.55 0.00 - 1.00 10*3/mm3    Eosinophils Absolute 0.26 0.10 - 0.30 10*3/mm3    Basophils Absolute 0.04 0.00 - 0.20 10*3/mm3    Immature Granulocyte Rel % 0.2 0.0 - 0.5 %    Immature Grans Absolute 0.01 0.00 - 0.03 10*3/mm3    nRBC 0.0 0.0 - 0.0 /100 WBC           Manuel was seen today for thrush and sleeping problem.    Diagnoses and all orders for this visit:    Anxiety    Primary insomnia    Reactive airway disease without complication, unspecified asthma severity, unspecified whether persistent    Thrush    Other orders  -     citalopram (CeleXA) 20 MG tablet; Take 0.5 tablets by mouth Daily.  -     traZODone (DESYREL) 50 MG tablet; Take 1 tablet by mouth Every Night.  -     nystatin (MYCOSTATIN) 993687 UNIT/ML suspension; Swish and swallow 5 mL 3 (Three) Times a Day for 7 days.        Can go up to 20mg on Celexa, but definitely  the 20mg prescription and start 1/2 of a pill once per day   Increase Melatonin to 10mg at night   Use Trazodone if he needs it   Call if things are not getting better with changes    Start Nystatin for thrush and rinse out mouth or brush teeth after using Symbicort     Needs PFTs when he is well, but sounds like he has some RAD related to allergies     Return if symptoms worsen or fail to improve.    Idalmis Whitehead MD  09/26/2019

## 2020-01-10 ENCOUNTER — OFFICE VISIT (OUTPATIENT)
Dept: INTERNAL MEDICINE | Facility: CLINIC | Age: 45
End: 2020-01-10

## 2020-01-10 VITALS
HEART RATE: 95 BPM | BODY MASS INDEX: 30.2 KG/M2 | SYSTOLIC BLOOD PRESSURE: 132 MMHG | WEIGHT: 223 LBS | OXYGEN SATURATION: 95 % | TEMPERATURE: 98 F | HEIGHT: 72 IN | DIASTOLIC BLOOD PRESSURE: 90 MMHG | RESPIRATION RATE: 16 BRPM

## 2020-01-10 DIAGNOSIS — Z20.818 EXPOSURE TO STREP THROAT: ICD-10-CM

## 2020-01-10 DIAGNOSIS — J45.909 REACTIVE AIRWAY DISEASE WITHOUT COMPLICATION, UNSPECIFIED ASTHMA SEVERITY, UNSPECIFIED WHETHER PERSISTENT: ICD-10-CM

## 2020-01-10 DIAGNOSIS — F41.9 ANXIETY: ICD-10-CM

## 2020-01-10 DIAGNOSIS — J30.89 PERENNIAL ALLERGIC RHINITIS WITH SEASONAL VARIATION: ICD-10-CM

## 2020-01-10 DIAGNOSIS — J30.2 PERENNIAL ALLERGIC RHINITIS WITH SEASONAL VARIATION: ICD-10-CM

## 2020-01-10 DIAGNOSIS — M54.50 CHRONIC BILATERAL LOW BACK PAIN WITHOUT SCIATICA: ICD-10-CM

## 2020-01-10 DIAGNOSIS — G89.29 CHRONIC BILATERAL LOW BACK PAIN WITHOUT SCIATICA: ICD-10-CM

## 2020-01-10 DIAGNOSIS — H61.22 IMPACTED CERUMEN OF LEFT EAR: ICD-10-CM

## 2020-01-10 DIAGNOSIS — E78.2 MIXED HYPERLIPIDEMIA: Primary | ICD-10-CM

## 2020-01-10 DIAGNOSIS — F51.04 PSYCHOPHYSIOLOGICAL INSOMNIA: ICD-10-CM

## 2020-01-10 DIAGNOSIS — E55.9 VITAMIN D DEFICIENCY: ICD-10-CM

## 2020-01-10 LAB
EXPIRATION DATE: NORMAL
INTERNAL CONTROL: NORMAL
Lab: NORMAL
S PYO AG THROAT QL: NEGATIVE

## 2020-01-10 PROCEDURE — 87880 STREP A ASSAY W/OPTIC: CPT | Performed by: NURSE PRACTITIONER

## 2020-01-10 PROCEDURE — 99214 OFFICE O/P EST MOD 30 MIN: CPT | Performed by: NURSE PRACTITIONER

## 2020-01-10 NOTE — PROGRESS NOTES
Subjective    Manuel Bellamy is a 44 y.o. male presenting today for   Chief Complaint   Patient presents with   • Establish Care   • Hyperlipidemia   • Allergies       History of Present Illness     Manuel Bellamy presents today as a new patient to me to establish care.   Prior PCP was Idalmis Whitehead, and his current/chronic health conditions include:    Patient Active Problem List   Diagnosis   • Perennial allergic rhinitis with seasonal variation   • Psychophysiological insomnia   • Vitamin D deficiency   • Anxiety   • Mixed hyperlipidemia   • Chronic bilateral low back pain without sciatica   • Reactive airway disease without complication       Current Outpatient Medications:   •  citalopram (CeleXA) 20 MG tablet, Take 0.5 tablets by mouth Daily., Disp: 90 tablet, Rfl: 2  •  melatonin 5 MG tablet tablet, Take 10 mg by mouth., Disp: , Rfl:   •  mometasone (NASONEX) 50 MCG/ACT nasal spray, 2 sprays into the nostril(s) as directed by provider Daily., Disp: , Rfl:   •  montelukast (SINGULAIR) 10 MG tablet, Take 1 tablet by mouth Every Night., Disp: 90 tablet, Rfl: 3    HLD - not on med, not necessarily trying to control w/ lifestyle either    AR/RAD - AR is year-round, well controlled w/ nasonex and singulair; RAD is more w/ season change    Insomnia - seems to have resolved on it's own     LBP - currently stable    Vit D def - not on supp    Anxiety - stable, tana SSRI w/o SE    New complaints today include: Wife and daughter both pos for strep this week. Asymptomatic but would like to be tested.    The following portions of the patient's history were reviewed and updated as appropriate: allergies, current medications, past family history, past medical history, past social history, past surgical history and problem list.    Review of Systems   Constitutional: Negative.    HENT: Negative.    Eyes: Negative.    Respiratory: Negative.    Cardiovascular: Negative.    Gastrointestinal: Negative.    Genitourinary:  "Negative.    Musculoskeletal: Negative.    Skin: Negative.    Neurological: Negative.    Psychiatric/Behavioral: Negative.        Objective    Vitals:    01/10/20 1431   BP: 132/90   BP Location: Right arm   Patient Position: Sitting   Cuff Size: Adult   Pulse: 95   Resp: 16   Temp: 98 °F (36.7 °C)   TempSrc: Oral   SpO2: 95%   Weight: 101 kg (223 lb)   Height: 182.9 cm (72\")     Body mass index is 30.24 kg/m².  Nursing notes and vitals reviewed.    Physical Exam   Constitutional: He is oriented to person, place, and time. He appears well-developed and well-nourished. No distress.   HENT:   Right Ear: Tympanic membrane, external ear and ear canal normal.   Nose: Nose normal.   Mouth/Throat: Uvula is midline, oropharynx is clear and moist and mucous membranes are normal.   L EAC occluded w/ cerumen   Eyes: Conjunctivae are normal.   Neck: Neck supple. No thyroid mass and no thyromegaly present.   Cardiovascular: Regular rhythm, S1 normal, S2 normal and normal pulses. Exam reveals no gallop and no friction rub.   No murmur heard.  Pulmonary/Chest: Effort normal and breath sounds normal. He has no wheezes. He has no rhonchi. He has no rales.   Lymphadenopathy:     He has no cervical adenopathy.   Neurological: He is alert and oriented to person, place, and time.   Psychiatric: He has a normal mood and affect. His speech is normal and behavior is normal. Thought content normal. He is attentive.       Assessment and Plan    Manuel was seen today for establish care, hyperlipidemia and allergies.    Diagnoses and all orders for this visit:    Mixed hyperlipidemia  -     CBC & Differential; Future  -     Comprehensive Metabolic Panel; Future  -     Lipid Panel With / Chol / HDL Ratio; Future    Perennial allergic rhinitis with seasonal variation    Reactive airway disease without complication, unspecified asthma severity, unspecified whether persistent    Vitamin D deficiency  -     Vitamin D 25 Hydroxy; Future    Chronic " bilateral low back pain without sciatica    Psychophysiological insomnia    Anxiety  -     Thyroid Cascade Profile; Future    Exposure to strep throat  -     POC Rapid Strep A    Impacted cerumen of left ear  Comments:  - cleared by lavage per MA        Medications Discontinued During This Encounter   Medication Reason   • traZODone (DESYREL) 50 MG tablet Not Efficacious     Except as noted above, pt will continue current medications as noted in the medication list.    Return in about 4 months (around 5/10/2020) for Annual physical.

## 2020-01-15 ENCOUNTER — RESULTS ENCOUNTER (OUTPATIENT)
Dept: INTERNAL MEDICINE | Facility: CLINIC | Age: 45
End: 2020-01-15

## 2020-01-15 DIAGNOSIS — E55.9 VITAMIN D DEFICIENCY: ICD-10-CM

## 2020-01-15 DIAGNOSIS — E78.2 MIXED HYPERLIPIDEMIA: ICD-10-CM

## 2020-01-15 DIAGNOSIS — F41.9 ANXIETY: ICD-10-CM

## 2020-06-11 DIAGNOSIS — J30.1 CHRONIC SEASONAL ALLERGIC RHINITIS DUE TO POLLEN: ICD-10-CM

## 2020-06-12 RX ORDER — MONTELUKAST SODIUM 10 MG/1
TABLET ORAL
Qty: 90 TABLET | Refills: 0 | Status: SHIPPED | OUTPATIENT
Start: 2020-06-12 | End: 2020-10-08

## 2020-10-07 DIAGNOSIS — J30.1 CHRONIC SEASONAL ALLERGIC RHINITIS DUE TO POLLEN: ICD-10-CM

## 2020-10-08 RX ORDER — MONTELUKAST SODIUM 10 MG/1
TABLET ORAL
Qty: 90 TABLET | Refills: 0 | Status: SHIPPED | OUTPATIENT
Start: 2020-10-08 | End: 2020-12-14 | Stop reason: SDUPTHER

## 2020-10-29 RX ORDER — CITALOPRAM 20 MG/1
TABLET ORAL
Qty: 15 TABLET | Refills: 0 | Status: SHIPPED | OUTPATIENT
Start: 2020-10-29 | End: 2020-12-14 | Stop reason: SDUPTHER

## 2020-12-04 LAB
25(OH)D3+25(OH)D2 SERPL-MCNC: 26 NG/ML (ref 30–100)
ALBUMIN SERPL-MCNC: 4.4 G/DL (ref 3.5–5.2)
ALBUMIN/GLOB SERPL: 1.7 G/DL
ALP SERPL-CCNC: 81 U/L (ref 39–117)
ALT SERPL-CCNC: 28 U/L (ref 1–41)
AST SERPL-CCNC: 21 U/L (ref 1–40)
BASOPHILS # BLD AUTO: 0.02 10*3/MM3 (ref 0–0.2)
BASOPHILS NFR BLD AUTO: 0.4 % (ref 0–1.5)
BILIRUB SERPL-MCNC: 0.4 MG/DL (ref 0–1.2)
BUN SERPL-MCNC: 16 MG/DL (ref 6–20)
BUN/CREAT SERPL: 18.8 (ref 7–25)
CALCIUM SERPL-MCNC: 9.2 MG/DL (ref 8.6–10.5)
CHLORIDE SERPL-SCNC: 99 MMOL/L (ref 98–107)
CHOLEST SERPL-MCNC: 233 MG/DL (ref 0–200)
CHOLEST/HDLC SERPL: 5.55 {RATIO}
CO2 SERPL-SCNC: 24.9 MMOL/L (ref 22–29)
CREAT SERPL-MCNC: 0.85 MG/DL (ref 0.76–1.27)
EOSINOPHIL # BLD AUTO: 0.24 10*3/MM3 (ref 0–0.4)
EOSINOPHIL NFR BLD AUTO: 4.5 % (ref 0.3–6.2)
ERYTHROCYTE [DISTWIDTH] IN BLOOD BY AUTOMATED COUNT: 12.4 % (ref 12.3–15.4)
GLOBULIN SER CALC-MCNC: 2.6 GM/DL
GLUCOSE SERPL-MCNC: 94 MG/DL (ref 65–99)
HCT VFR BLD AUTO: 45.8 % (ref 37.5–51)
HDLC SERPL-MCNC: 42 MG/DL (ref 40–60)
HGB BLD-MCNC: 16 G/DL (ref 13–17.7)
IMM GRANULOCYTES # BLD AUTO: 0.02 10*3/MM3 (ref 0–0.05)
IMM GRANULOCYTES NFR BLD AUTO: 0.4 % (ref 0–0.5)
LDLC SERPL CALC-MCNC: 144 MG/DL (ref 0–100)
LYMPHOCYTES # BLD AUTO: 2.01 10*3/MM3 (ref 0.7–3.1)
LYMPHOCYTES NFR BLD AUTO: 37.9 % (ref 19.6–45.3)
MCH RBC QN AUTO: 30.8 PG (ref 26.6–33)
MCHC RBC AUTO-ENTMCNC: 34.9 G/DL (ref 31.5–35.7)
MCV RBC AUTO: 88.1 FL (ref 79–97)
MONOCYTES # BLD AUTO: 0.51 10*3/MM3 (ref 0.1–0.9)
MONOCYTES NFR BLD AUTO: 9.6 % (ref 5–12)
NEUTROPHILS # BLD AUTO: 2.51 10*3/MM3 (ref 1.7–7)
NEUTROPHILS NFR BLD AUTO: 47.2 % (ref 42.7–76)
NRBC BLD AUTO-RTO: 0 /100 WBC (ref 0–0.2)
PLATELET # BLD AUTO: 247 10*3/MM3 (ref 140–450)
POTASSIUM SERPL-SCNC: 4.3 MMOL/L (ref 3.5–5.2)
PROT SERPL-MCNC: 7 G/DL (ref 6–8.5)
RBC # BLD AUTO: 5.2 10*6/MM3 (ref 4.14–5.8)
SODIUM SERPL-SCNC: 133 MMOL/L (ref 136–145)
TRIGL SERPL-MCNC: 256 MG/DL (ref 0–150)
TSH SERPL DL<=0.005 MIU/L-ACNC: 3.72 UIU/ML (ref 0.45–4.5)
VLDLC SERPL CALC-MCNC: 47 MG/DL (ref 5–40)
WBC # BLD AUTO: 5.31 10*3/MM3 (ref 3.4–10.8)

## 2020-12-14 ENCOUNTER — TELEPHONE (OUTPATIENT)
Dept: INTERNAL MEDICINE | Facility: CLINIC | Age: 45
End: 2020-12-14

## 2020-12-14 ENCOUNTER — OFFICE VISIT (OUTPATIENT)
Dept: INTERNAL MEDICINE | Facility: CLINIC | Age: 45
End: 2020-12-14

## 2020-12-14 DIAGNOSIS — E55.9 VITAMIN D DEFICIENCY: ICD-10-CM

## 2020-12-14 DIAGNOSIS — K12.0 RECURRENT CANKER SORES: ICD-10-CM

## 2020-12-14 DIAGNOSIS — F41.9 ANXIETY: ICD-10-CM

## 2020-12-14 DIAGNOSIS — J30.89 PERENNIAL ALLERGIC RHINITIS WITH SEASONAL VARIATION: ICD-10-CM

## 2020-12-14 DIAGNOSIS — Z00.00 ROUTINE HEALTH MAINTENANCE: ICD-10-CM

## 2020-12-14 DIAGNOSIS — J30.2 PERENNIAL ALLERGIC RHINITIS WITH SEASONAL VARIATION: ICD-10-CM

## 2020-12-14 DIAGNOSIS — E78.2 MIXED HYPERLIPIDEMIA: Primary | ICD-10-CM

## 2020-12-14 PROCEDURE — 99443 PR PHYS/QHP TELEPHONE EVALUATION 21-30 MIN: CPT | Performed by: NURSE PRACTITIONER

## 2020-12-14 RX ORDER — TRIAMCINOLONE ACETONIDE 0.1 %
PASTE (GRAM) DENTAL
Qty: 5 G | Refills: 1 | Status: SHIPPED | OUTPATIENT
Start: 2020-12-14 | End: 2022-03-31

## 2020-12-14 RX ORDER — CITALOPRAM 20 MG/1
10 TABLET ORAL DAILY
Qty: 45 TABLET | Refills: 1 | Status: SHIPPED | OUTPATIENT
Start: 2020-12-14 | End: 2021-07-08

## 2020-12-14 RX ORDER — MONTELUKAST SODIUM 10 MG/1
10 TABLET ORAL EVERY EVENING
Qty: 90 TABLET | Refills: 1 | Status: SHIPPED | OUTPATIENT
Start: 2020-12-14 | End: 2021-03-04

## 2020-12-14 NOTE — TELEPHONE ENCOUNTER
Pt was contacted and appointments scheduled.    ----- Message from SACHIN Patel sent at 12/14/2020  8:39 AM EST -----  Please call pt to scheduled these two appointments

## 2020-12-14 NOTE — PROGRESS NOTES
Subjective   Manuel Bellamy is a 45 y.o. male presenting today for follow up of   Chief Complaint   Patient presents with   • Hyperlipidemia   • Anxiety     This visit was conducted via telephone telehealth.  You have chosen to receive care through a telehealth visit.  Do you consent to use a video/audio connection for your medical care today? Yes  For coding purposes, time spent was 23 minutes.      History of Present Illness     Patient Active Problem List   Diagnosis   • Perennial allergic rhinitis with seasonal variation   • Psychophysiological insomnia   • Vitamin D deficiency   • Anxiety   • Mixed hyperlipidemia   • Chronic bilateral low back pain without sciatica   • Reactive airway disease without complication       Current Outpatient Medications on File Prior to Visit   Medication Sig   • melatonin 5 MG tablet tablet Take 10 mg by mouth.   • mometasone (NASONEX) 50 MCG/ACT nasal spray 2 sprays into the nostril(s) as directed by provider Daily.   • [DISCONTINUED] citalopram (CeleXA) 20 MG tablet Take 1/2 (one-half) tablet by mouth once daily   • [DISCONTINUED] montelukast (SINGULAIR) 10 MG tablet Take 1 tablet by mouth in the evening     No current facility-administered medications on file prior to visit.       HLD - not on med, not necessarily trying to control w/ lifestyle either     AR/RAD - AR is year-round, well controlled w/ nasonex and singulair; RAD is more w/ season change    Vit D def - not on supp     Anxiety - stable, tana SSRI w/o SE    Canker sores - intermittent, has had triamcinolone paste which worked well and requests refill for this      The following portions of the patient's history were reviewed and updated as appropriate: allergies, current medications, past family history, past medical history, past social history, past surgical history and problem list.    Review of Systems   Constitutional: Negative.    HENT: Negative.    Eyes: Negative.    Respiratory: Negative.     Cardiovascular: Negative.    Gastrointestinal: Negative.    Genitourinary: Negative.    Musculoskeletal: Negative.    Skin: Negative.    Neurological: Negative.    Psychiatric/Behavioral: Negative.        Objective   There were no vitals filed for this visit.  There is no height or weight on file to calculate BMI.  Nursing notes and vital reviewed.    Physical Exam  Constitutional:       General: He is not in acute distress.  Pulmonary:      Effort: Pulmonary effort is normal. No respiratory distress.   Neurological:      Mental Status: He is alert and oriented to person, place, and time.   Psychiatric:         Speech: Speech normal.         Thought Content: Thought content normal.         Recent Results (from the past 672 hour(s))   CBC & Differential    Collection Time: 12/03/20  9:19 AM    Specimen: Blood   Result Value Ref Range    WBC 5.31 3.40 - 10.80 10*3/mm3    RBC 5.20 4.14 - 5.80 10*6/mm3    Hemoglobin 16.0 13.0 - 17.7 g/dL    Hematocrit 45.8 37.5 - 51.0 %    MCV 88.1 79.0 - 97.0 fL    MCH 30.8 26.6 - 33.0 pg    MCHC 34.9 31.5 - 35.7 g/dL    RDW 12.4 12.3 - 15.4 %    Platelets 247 140 - 450 10*3/mm3    Neutrophil Rel % 47.2 42.7 - 76.0 %    Lymphocyte Rel % 37.9 19.6 - 45.3 %    Monocyte Rel % 9.6 5.0 - 12.0 %    Eosinophil Rel % 4.5 0.3 - 6.2 %    Basophil Rel % 0.4 0.0 - 1.5 %    Neutrophils Absolute 2.51 1.70 - 7.00 10*3/mm3    Lymphocytes Absolute 2.01 0.70 - 3.10 10*3/mm3    Monocytes Absolute 0.51 0.10 - 0.90 10*3/mm3    Eosinophils Absolute 0.24 0.00 - 0.40 10*3/mm3    Basophils Absolute 0.02 0.00 - 0.20 10*3/mm3    Immature Granulocyte Rel % 0.4 0.0 - 0.5 %    Immature Grans Absolute 0.02 0.00 - 0.05 10*3/mm3    nRBC 0.0 0.0 - 0.2 /100 WBC   Comprehensive Metabolic Panel    Collection Time: 12/03/20  9:19 AM    Specimen: Blood   Result Value Ref Range    Glucose 94 65 - 99 mg/dL    BUN 16 6 - 20 mg/dL    Creatinine 0.85 0.76 - 1.27 mg/dL    eGFR Non African Am 97 >60 mL/min/1.73    eGFR   Am 118 >60 mL/min/1.73    BUN/Creatinine Ratio 18.8 7.0 - 25.0    Sodium 133 (L) 136 - 145 mmol/L    Potassium 4.3 3.5 - 5.2 mmol/L    Chloride 99 98 - 107 mmol/L    Total CO2 24.9 22.0 - 29.0 mmol/L    Calcium 9.2 8.6 - 10.5 mg/dL    Total Protein 7.0 6.0 - 8.5 g/dL    Albumin 4.40 3.50 - 5.20 g/dL    Globulin 2.6 gm/dL    A/G Ratio 1.7 g/dL    Total Bilirubin 0.4 0.0 - 1.2 mg/dL    Alkaline Phosphatase 81 39 - 117 U/L    AST (SGOT) 21 1 - 40 U/L    ALT (SGPT) 28 1 - 41 U/L   Lipid Panel With / Chol / HDL Ratio    Collection Time: 12/03/20  9:19 AM    Specimen: Blood   Result Value Ref Range    Total Cholesterol 233 (H) 0 - 200 mg/dL    Triglycerides 256 (H) 0 - 150 mg/dL    HDL Cholesterol 42 40 - 60 mg/dL    VLDL Cholesterol Russell 47 (H) 5 - 40 mg/dL    LDL Chol Calc (NIH) 144 (H) 0 - 100 mg/dL    Chol/HDL Ratio 5.55    Vitamin D 25 Hydroxy    Collection Time: 12/03/20  9:19 AM    Specimen: Blood   Result Value Ref Range    25 Hydroxy, Vitamin D 26.0 (L) 30.0 - 100.0 ng/ml   Thyroid Cascade Profile    Collection Time: 12/03/20  9:19 AM    Specimen: Blood   Result Value Ref Range    TSH 3.720 0.450 - 4.500 uIU/mL     Each of these lab results were discussed individually in detail with the patient.      Assessment/Plan   Diagnoses and all orders for this visit:    1. Mixed hyperlipidemia (Primary)  Comments:  - uncontrolled  - encouraged diligent TLC  - recheck in 6mo  Orders:  -     Comprehensive Metabolic Panel; Future  -     Lipid Panel With / Chol / HDL Ratio; Future    2. Perennial allergic rhinitis with seasonal variation  Comments:  - controlled  - RF singulair  Orders:  -     montelukast (SINGULAIR) 10 MG tablet; Take 1 tablet by mouth Every Evening.  Dispense: 90 tablet; Refill: 1    3. Vitamin D deficiency  Comments:  - insufficient  - start 2000IU QD OTC supp  Orders:  -     Vitamin D 25 Hydroxy; Future    4. Anxiety  Comments:  - stable  - RF SSRI  Orders:  -     citalopram (CeleXA) 20 MG tablet; Take 0.5  tablets by mouth Daily.  Dispense: 45 tablet; Refill: 1    5. Recurrent canker sores  -     triamcinolone (KENALOG) 0.1 % paste; Apply to affected areas twice daily as needed  Dispense: 5 g; Refill: 1    6. Routine health maintenance  Comments:  - discussed colon cancer screening  - he will think on this and let me know if he prefers CLS or cologuard by end of year  Orders:  -     Hepatitis C Antibody; Future              Medications, including side effects, were discussed with the patient. Patient verbalized understanding.  The plan of care was discussed. All questions were answered. Patient verbalized understanding.      Return in about 6 months (around 6/14/2021) for Annual physical.

## 2020-12-19 ENCOUNTER — RESULTS ENCOUNTER (OUTPATIENT)
Dept: INTERNAL MEDICINE | Facility: CLINIC | Age: 45
End: 2020-12-19

## 2020-12-19 DIAGNOSIS — E55.9 VITAMIN D DEFICIENCY: ICD-10-CM

## 2020-12-19 DIAGNOSIS — E78.2 MIXED HYPERLIPIDEMIA: ICD-10-CM

## 2020-12-19 DIAGNOSIS — Z00.00 ROUTINE HEALTH MAINTENANCE: ICD-10-CM

## 2021-03-04 DIAGNOSIS — J30.89 PERENNIAL ALLERGIC RHINITIS WITH SEASONAL VARIATION: ICD-10-CM

## 2021-03-04 DIAGNOSIS — J30.2 PERENNIAL ALLERGIC RHINITIS WITH SEASONAL VARIATION: ICD-10-CM

## 2021-03-04 RX ORDER — MONTELUKAST SODIUM 10 MG/1
TABLET ORAL
Qty: 90 TABLET | Refills: 3 | Status: SHIPPED | OUTPATIENT
Start: 2021-03-04 | End: 2022-03-31 | Stop reason: SDUPTHER

## 2021-06-15 LAB
25(OH)D3+25(OH)D2 SERPL-MCNC: 32.5 NG/ML (ref 30–100)
ALBUMIN SERPL-MCNC: 4.6 G/DL (ref 3.5–5.2)
ALBUMIN/GLOB SERPL: 2 G/DL
ALP SERPL-CCNC: 83 U/L (ref 39–117)
ALT SERPL-CCNC: 29 U/L (ref 1–41)
AST SERPL-CCNC: 22 U/L (ref 1–40)
BILIRUB SERPL-MCNC: 0.6 MG/DL (ref 0–1.2)
BUN SERPL-MCNC: 15 MG/DL (ref 6–20)
BUN/CREAT SERPL: 16.5 (ref 7–25)
CALCIUM SERPL-MCNC: 9.4 MG/DL (ref 8.6–10.5)
CHLORIDE SERPL-SCNC: 102 MMOL/L (ref 98–107)
CHOLEST SERPL-MCNC: 215 MG/DL (ref 0–200)
CHOLEST/HDLC SERPL: 5.66 {RATIO}
CO2 SERPL-SCNC: 27 MMOL/L (ref 22–29)
CREAT SERPL-MCNC: 0.91 MG/DL (ref 0.76–1.27)
GLOBULIN SER CALC-MCNC: 2.3 GM/DL
GLUCOSE SERPL-MCNC: 95 MG/DL (ref 65–99)
HCV AB S/CO SERPL IA: <0.1 S/CO RATIO (ref 0–0.9)
HDLC SERPL-MCNC: 38 MG/DL (ref 40–60)
LDLC SERPL CALC-MCNC: 128 MG/DL (ref 0–100)
POTASSIUM SERPL-SCNC: 4.4 MMOL/L (ref 3.5–5.2)
PROT SERPL-MCNC: 6.9 G/DL (ref 6–8.5)
SODIUM SERPL-SCNC: 141 MMOL/L (ref 136–145)
TRIGL SERPL-MCNC: 276 MG/DL (ref 0–150)
VLDLC SERPL CALC-MCNC: 49 MG/DL (ref 5–40)

## 2021-07-06 ENCOUNTER — OFFICE VISIT (OUTPATIENT)
Dept: INTERNAL MEDICINE | Facility: CLINIC | Age: 46
End: 2021-07-06

## 2021-07-06 VITALS
SYSTOLIC BLOOD PRESSURE: 124 MMHG | BODY MASS INDEX: 30.64 KG/M2 | HEART RATE: 87 BPM | OXYGEN SATURATION: 99 % | DIASTOLIC BLOOD PRESSURE: 80 MMHG | RESPIRATION RATE: 18 BRPM | WEIGHT: 226.2 LBS | TEMPERATURE: 97 F | HEIGHT: 72 IN

## 2021-07-06 DIAGNOSIS — J30.2 PERENNIAL ALLERGIC RHINITIS WITH SEASONAL VARIATION: ICD-10-CM

## 2021-07-06 DIAGNOSIS — F41.9 ANXIETY: ICD-10-CM

## 2021-07-06 DIAGNOSIS — E55.9 VITAMIN D DEFICIENCY: ICD-10-CM

## 2021-07-06 DIAGNOSIS — J30.89 PERENNIAL ALLERGIC RHINITIS WITH SEASONAL VARIATION: ICD-10-CM

## 2021-07-06 DIAGNOSIS — J45.909 REACTIVE AIRWAY DISEASE WITHOUT COMPLICATION, UNSPECIFIED ASTHMA SEVERITY, UNSPECIFIED WHETHER PERSISTENT: ICD-10-CM

## 2021-07-06 DIAGNOSIS — Z00.00 ENCOUNTER FOR WELLNESS EXAMINATION IN ADULT: Primary | ICD-10-CM

## 2021-07-06 DIAGNOSIS — E78.2 MIXED HYPERLIPIDEMIA: ICD-10-CM

## 2021-07-06 DIAGNOSIS — Z12.11 SCREENING FOR MALIGNANT NEOPLASM OF COLON: ICD-10-CM

## 2021-07-06 PROCEDURE — 99396 PREV VISIT EST AGE 40-64: CPT | Performed by: NURSE PRACTITIONER

## 2021-07-06 NOTE — PROGRESS NOTES
"KIMBERLEY Jackson is a 46 y.o. male presenting for Annual Exam    His current/chronic health conditions include:  Patient Active Problem List   Diagnosis   • Perennial allergic rhinitis with seasonal variation   • Psychophysiological insomnia   • Vitamin D deficiency   • Anxiety   • Mixed hyperlipidemia   • Chronic bilateral low back pain without sciatica   • Reactive airway disease without complication       Current Outpatient Medications on File Prior to Visit   Medication Sig   • citalopram (CeleXA) 20 MG tablet Take 0.5 tablets by mouth Daily.   • melatonin 5 MG tablet tablet Take 10 mg by mouth.   • mometasone (NASONEX) 50 MCG/ACT nasal spray 2 sprays into the nostril(s) as directed by provider Daily.   • montelukast (SINGULAIR) 10 MG tablet Take 1 tablet by mouth in the evening   • Multiple Vitamins-Minerals (Centrum Flavor Burst) chewable tablet Chew.   • triamcinolone (KENALOG) 0.1 % paste Apply to affected areas twice daily as needed     No current facility-administered medications on file prior to visit.      Chronic Health Conditions Addressed:    HLD - not on med, not necessarily trying to control w/ lifestyle either     AR/RAD - AR is year-round, well controlled w/ nasonex and singulair; RAD is more w/ season change     Vit D def - not on supp     Anxiety - stable, tana SSRI w/o SE    Health Habits:  Nutrition: \"I have been eating out less.\"  Exercise: 7 times/week.  Current exercise activities include: cardiovascular workout on exercise equipment and walking    Screenings:  Dental Exam: UTD  Eye Exam: UTD  PSA: n/a  Colon Cancer: no FH, asymptomatic  Tob use: never      New complaints today include: none      The patient's allergies, current medications, problem list, past medical history, past family history, past medical history, and past social history were reviewed and updated as appropriate.    Review of Systems   Constitutional: Negative.    HENT: Negative.    Eyes: Negative.    Respiratory: " "Negative.    Cardiovascular: Negative.    Gastrointestinal: Negative.    Endocrine: Negative.    Genitourinary: Negative.    Musculoskeletal: Negative.    Skin: Negative.    Allergic/Immunologic: Negative.    Neurological: Negative.    Hematological: Negative.    Psychiatric/Behavioral: Negative.        OBJECTIVE    Vitals:    07/06/21 0833   BP: 124/80   Pulse: 87   Resp: 18   Temp: 97 °F (36.1 °C)   TempSrc: Temporal   SpO2: 99%   Weight: 103 kg (226 lb 3.2 oz)   Height: 182.9 cm (72.01\")       BP Readings from Last 3 Encounters:   07/06/21 124/80   01/10/20 132/90   09/26/19 122/94       Wt Readings from Last 3 Encounters:   07/06/21 103 kg (226 lb 3.2 oz)   01/10/20 101 kg (223 lb)   09/26/19 99.1 kg (218 lb 6.4 oz)       Body mass index is 30.67 kg/m².  Nursing notes and vital signs reviewed.    Physical Exam  Constitutional:       General: He is not in acute distress.     Appearance: Normal appearance. He is well-developed.   HENT:      Head: Normocephalic.      Right Ear: Hearing, tympanic membrane, ear canal and external ear normal.      Left Ear: Hearing, tympanic membrane, ear canal and external ear normal.      Nose: Nose normal. No mucosal edema or rhinorrhea.      Mouth/Throat:      Mouth: Mucous membranes are moist.      Pharynx: Oropharynx is clear. Uvula midline.   Eyes:      General: Lids are normal.      Extraocular Movements: Extraocular movements intact.      Conjunctiva/sclera: Conjunctivae normal.      Pupils: Pupils are equal, round, and reactive to light.   Neck:      Thyroid: No thyroid mass or thyromegaly.   Cardiovascular:      Rate and Rhythm: Regular rhythm.      Pulses: Normal pulses.      Heart sounds: S1 normal and S2 normal. No murmur heard.   No friction rub. No gallop.    Pulmonary:      Effort: Pulmonary effort is normal.      Breath sounds: Normal breath sounds. No wheezing, rhonchi or rales.   Abdominal:      General: Bowel sounds are normal.      Palpations: Abdomen is soft.    "   Tenderness: There is no abdominal tenderness. There is no guarding.      Hernia: No hernia is present.   Musculoskeletal:         General: No deformity. Normal range of motion.      Cervical back: Normal range of motion and neck supple.   Lymphadenopathy:      Cervical: No cervical adenopathy.   Skin:     General: Skin is warm and dry.      Findings: No lesion or rash.   Neurological:      General: No focal deficit present.      Mental Status: He is alert and oriented to person, place, and time.      Cranial Nerves: No cranial nerve deficit.      Sensory: No sensory deficit.      Motor: Motor function is intact.      Coordination: Coordination is intact.      Gait: Gait normal.      Deep Tendon Reflexes: Reflexes are normal and symmetric.   Psychiatric:         Attention and Perception: He is attentive.         Mood and Affect: Mood and affect normal.         Speech: Speech normal.         Behavior: Behavior normal.         Thought Content: Thought content normal.           Recent Results (from the past 672 hour(s))   Comprehensive Metabolic Panel    Collection Time: 06/14/21  8:06 AM    Specimen: Blood   Result Value Ref Range    Glucose 95 65 - 99 mg/dL    BUN 15 6 - 20 mg/dL    Creatinine 0.91 0.76 - 1.27 mg/dL    eGFR Non African Am 89 >60 mL/min/1.73    eGFR African Am 108 >60 mL/min/1.73    BUN/Creatinine Ratio 16.5 7.0 - 25.0    Sodium 141 136 - 145 mmol/L    Potassium 4.4 3.5 - 5.2 mmol/L    Chloride 102 98 - 107 mmol/L    Total CO2 27.0 22.0 - 29.0 mmol/L    Calcium 9.4 8.6 - 10.5 mg/dL    Total Protein 6.9 6.0 - 8.5 g/dL    Albumin 4.60 3.50 - 5.20 g/dL    Globulin 2.3 gm/dL    A/G Ratio 2.0 g/dL    Total Bilirubin 0.6 0.0 - 1.2 mg/dL    Alkaline Phosphatase 83 39 - 117 U/L    AST (SGOT) 22 1 - 40 U/L    ALT (SGPT) 29 1 - 41 U/L   Lipid Panel With / Chol / HDL Ratio    Collection Time: 06/14/21  8:06 AM    Specimen: Blood   Result Value Ref Range    Total Cholesterol 215 (H) 0 - 200 mg/dL     Triglycerides 276 (H) 0 - 150 mg/dL    HDL Cholesterol 38 (L) 40 - 60 mg/dL    VLDL Cholesterol Russell 49 (H) 5 - 40 mg/dL    LDL Chol Calc (NIH) 128 (H) 0 - 100 mg/dL    Chol/HDL Ratio 5.66    Vitamin D 25 Hydroxy    Collection Time: 06/14/21  8:06 AM    Specimen: Blood   Result Value Ref Range    25 Hydroxy, Vitamin D 32.5 30.0 - 100.0 ng/ml   Hepatitis C Antibody    Collection Time: 06/14/21  8:06 AM    Specimen: Blood   Result Value Ref Range    Hep C Virus Ab <0.1 0.0 - 0.9 s/co ratio     Each of these results were discussed individually in detail with the patient.      ASSESSMENT AND PLAN    Diagnoses and all orders for this visit:    1. Encounter for wellness examination in adult (Primary)    2. Mixed hyperlipidemia  Comments:  - uncontrolled  - no FH of CVD  - cont to focus on TLCs    3. Perennial allergic rhinitis with seasonal variation  Comments:  - controlled    4. Reactive airway disease without complication, unspecified asthma severity, unspecified whether persistent  Comments:  - controlled    5. Vitamin D deficiency  Comments:  - improved    6. Anxiety  Comments:  - controlled    7. Screening for malignant neoplasm of colon  -     Cologuard - Stool, Per Rectum; Future          Preventative counseling completed including relevant screenings, appropriate vaccinations, healthy nutrition, and appropriate physical activity.  Patient's Body mass index is 30.67 kg/m². indicating that he is obese (BMI >30). Obesity-related health conditions include the following: dyslipidemias. Obesity is worsening. BMI is is above average; BMI management plan is completed. We discussed portion control and increasing exercise..          Medications, including side effects, were discussed with the patient. Patient verbalized understanding.  The plan of care was discussed. All questions were answered. Patient verbalized understanding.        Return in about 6 months (around 1/6/2022) for HLD, ANABELLA; w/ fasting labs one week prior.,  or sooner if needed.

## 2021-07-07 DIAGNOSIS — F41.9 ANXIETY: ICD-10-CM

## 2021-07-08 RX ORDER — CITALOPRAM 20 MG/1
TABLET ORAL
Qty: 45 TABLET | Refills: 3 | Status: SHIPPED | OUTPATIENT
Start: 2021-07-08 | End: 2022-03-31 | Stop reason: SDUPTHER

## 2021-08-11 ENCOUNTER — TELEPHONE (OUTPATIENT)
Dept: INTERNAL MEDICINE | Facility: CLINIC | Age: 46
End: 2021-08-11

## 2021-08-11 NOTE — TELEPHONE ENCOUNTER
Caller: Manuel Bellamy    Relationship: Self    Best call back number: [phone]     What is the best time to reach you: ANYTIME     Who are you requesting to speak with (clinical staff, provider,  specific staff member): CLINICAL    What was the call regarding: PATIENT WANTED TO KNOW IF HIS COLONGUARD RESULTS WERE IN YET AND IF THE OFFICE CAN GIVE HIM A CALL ONCE THEY ARE IN.

## 2021-09-09 ENCOUNTER — TELEPHONE (OUTPATIENT)
Dept: INTERNAL MEDICINE | Facility: CLINIC | Age: 46
End: 2021-09-09

## 2021-09-09 NOTE — TELEPHONE ENCOUNTER
Caller: Manuel Bellamy    Relationship to patient: Self    Best call back number: [phone]     Patient is needing: PATIENT IS CALLING TO STATE HE CLEANED A GALLON OF PAINT ON Tuesday AND CLEANED IT UP WITH A LAQUER THINNER.  HE STATES HE AWOKE TODAY WITH SHORTNESS OF AIR.  HE IS WANTING TO KNOW IF MS PATE THINKS HE MAY HAVE IRRITATED HIS LUNGS WITH THE LAQUER THINNER.. HE STATES HE WENT AHEAD AND GOT A COVID TEST, BUT NO RESULTS YET.  PATIENT IS WANTING TO KNOW WHAT MS FATIMA WOULD RECOMMEND.  HE STATES HE HAS AN INHALER, BUT UNSURE WHETHER TO USE.       [facility] [address] [place] [address], [place] - [address] - [phone] [facility] [phone]   [phone]    PLEASE ADVISE.

## 2021-09-10 NOTE — TELEPHONE ENCOUNTER
Spoke with pt he states he wants to know if he can take his inhaler after having an incident at work pt states Tuesday him and his coworkers cleaned up a gallon of paint with paint thinner and every since then its rupert hard to breath and he has tightness in his chest he was wondering if this can affect his lungs? Pt also states he went and got tested for covid but no results back yet please advise. Also told pt if he is having shortness of breath and tightness of the chest to go to the ER.

## 2021-09-10 NOTE — TELEPHONE ENCOUNTER
Called and spoke with pt and let him know there was no available appt in office today and to go to the [facility] pt refuses to go to the [facility] states he does not want to to be around anyone sick.

## 2021-09-10 NOTE — TELEPHONE ENCOUNTER
Pt needs to be seen. Would advise to go to [facility] if there are no available appointments in our office today.

## 2021-09-22 DIAGNOSIS — J30.1 CHRONIC SEASONAL ALLERGIC RHINITIS DUE TO POLLEN: ICD-10-CM

## 2021-09-22 RX ORDER — BENZOYL PEROXIDE
KIT TOPICAL
Qty: 90 TABLET | Refills: 0 | OUTPATIENT
Start: 2021-09-22

## 2021-11-22 ENCOUNTER — TELEPHONE (OUTPATIENT)
Dept: INTERNAL MEDICINE | Facility: CLINIC | Age: 46
End: 2021-11-22

## 2021-11-22 NOTE — TELEPHONE ENCOUNTER
PATIENT SPOKE TO GEE EARLIER AND WAS INSTRUCTED TO SET UP HIS MYCHART AND CALL BACK TO SET UP APPOINTMENT BUT THERE IS NO AVAILABILITY FOR KUSHAL STEPHENSON. PLEASE CALL AND ADVISE.

## 2021-11-22 NOTE — TELEPHONE ENCOUNTER
[facility] will not allow for telephone only visit. Needs Video visit. I can utilize [employer] if he has an iPhone. Or he can s/w IT about his [employer] account.

## 2021-11-22 NOTE — TELEPHONE ENCOUNTER
Caller: Manuel Bellamy    Relationship: Self    Best call back number:[phone]    What is the best time to reach you: ANY    Who are you requesting to speak with (clinical staff, provider,  specific staff member): KUSHAL PATE    What was the call regarding: PATIENT IS REQUESTING A CALL BACK TO DISCUSS GETTING THE INFUSION. PATIENT TESTED POSITIVE FOR COVID ON 11/18/21. PLEASE RETURN CALL    Do you require a callback: YES

## 2021-11-22 NOTE — TELEPHONE ENCOUNTER
I called patient to get him scheduled for a video visit for you all to discuss his treatment options and he informed me that his mychart is not longer available as he has forgotten his password. Would you all be able to do a telephone visit instead?

## 2021-11-22 NOTE — TELEPHONE ENCOUNTER
Spoke with pt and he will be getting in contact with IT to set up a new password. He will then call us back to schedule.

## 2021-11-23 NOTE — TELEPHONE ENCOUNTER
RABIA for patient and informed him that we can get him seen by Dr. Cox through a video visit. He just needs to call the office and schedule.

## 2021-11-23 NOTE — TELEPHONE ENCOUNTER
PT CALLED BACK TO SCHEDULE MYCHART VISIT TODAY, NOTHING IS AVAILABLE. WAS INSTRUCTED TO LEAVE A MESSAGE.

## 2021-11-24 ENCOUNTER — TRANSCRIBE ORDERS (OUTPATIENT)
Dept: ADMINISTRATIVE | Facility: HOSPITAL | Age: 46
End: 2021-11-24

## 2021-11-24 ENCOUNTER — TELEMEDICINE (OUTPATIENT)
Dept: INTERNAL MEDICINE | Facility: CLINIC | Age: 46
End: 2021-11-24

## 2021-11-24 VITALS
HEIGHT: 72 IN | BODY MASS INDEX: 30.48 KG/M2 | TEMPERATURE: 99.6 F | OXYGEN SATURATION: 97 % | HEART RATE: 102 BPM | WEIGHT: 225 LBS

## 2021-11-24 DIAGNOSIS — U07.1 CLINICAL DIAGNOSIS OF SEVERE ACUTE RESPIRATORY SYNDROME CORONAVIRUS 2 (SARS-COV-2) DISEASE: Primary | ICD-10-CM

## 2021-11-24 DIAGNOSIS — U07.1 COVID-19: Primary | ICD-10-CM

## 2021-11-24 PROCEDURE — 99213 OFFICE O/P EST LOW 20 MIN: CPT | Performed by: NURSE PRACTITIONER

## 2021-11-24 RX ORDER — ALBUTEROL SULFATE 90 UG/1
2 AEROSOL, METERED RESPIRATORY (INHALATION) EVERY 4 HOURS PRN
Qty: 18 G | Refills: 0
Start: 2021-11-24 | End: 2022-07-08

## 2021-11-24 NOTE — PROGRESS NOTES
Covid 19    Subjective     Manuel Bellamy is a 46 y.o. male being seen for an acute visit for Covid 19. He was seen in urgent care 11- for body aches and headache. He had started with symptoms the night before of low grade fever. He is not vaccinated. Associated cough, SOA, body aches. He is not vaccinated.  He was told to take Zinc, Vitamin D and hydrate, which he is doing. He is taking Silvana seltzer cold and cough.    You have chosen to receive care through a telehealth visit.  Do you consent to use a video/audio connection for your medical care today? Yes      History of Present Illness     Allergies   Allergen Reactions   • Tessalon [Benzonatate] Anxiety         Current Outpatient Medications:   •  citalopram (CeleXA) 20 MG tablet, Take 1/2 (one-half) tablet by mouth once daily, Disp: 45 tablet, Rfl: 3  •  melatonin 5 MG tablet tablet, Take 10 mg by mouth., Disp: , Rfl:   •  meloxicam (MOBIC) 15 MG tablet, 1 tab by mouth daily, Disp: 30 tablet, Rfl: 2  •  methylPREDNISolone (MEDROL) 4 MG dose pack, Take by mouth as directed per package directions., Disp: 21 tablet, Rfl: 0  •  mometasone (NASONEX) 50 MCG/ACT nasal spray, 2 sprays into the nostril(s) as directed by provider Daily., Disp: , Rfl:   •  montelukast (SINGULAIR) 10 MG tablet, Take 1 tablet by mouth in the evening, Disp: 90 tablet, Rfl: 3  •  Multiple Vitamins-Minerals (Centrum Flavor Burst) chewable tablet, Chew., Disp: , Rfl:   •  triamcinolone (KENALOG) 0.1 % paste, Apply to affected areas twice daily as needed, Disp: 5 g, Rfl: 1    The following portions of the patient's history were reviewed and updated as appropriate: allergies, current medications, past family history, past medical history, past social history, past surgical history and problem list.    Review of Systems   Constitutional: Positive for fatigue.   HENT: Positive for congestion and rhinorrhea. Negative for sneezing and sore throat.    Eyes: Negative.    Respiratory:  Positive for cough. Negative for shortness of breath, wheezing and stridor.    Cardiovascular: Negative.  Negative for chest pain and leg swelling.   Endocrine: Negative.    Genitourinary: Negative.    All other systems reviewed and are negative.      Assessment     Physical Exam  Vitals reviewed.   Neurological:      Mental Status: He is oriented to person, place, and time.   Psychiatric:         Mood and Affect: Mood normal.         Behavior: Behavior normal.         Thought Content: Thought content normal.         Plan      Diagnosis Plan   1. COVID-19  albuterol sulfate  (90 Base) MCG/ACT inhaler       The FDA has issued an Emergency Use Authorization (EUA) to permit emergency use of the unapproved product bamlanivimab for treatment of laboratory confirmed COVID-19 in certain ambulatory patients. There is no prescribing information or package insert, however, the FDA has authorized distribution of Fact Sheets for patients and/or caregivers for additional information on this investigational therapy. I have provided the Fact Sheet to and discussed the following with the patient and he/she agreed to proceed:  • FDA has authorized the emergency use (EUA) of bamlanivimab, which is not an FDA approved drug.  • The patient has the option to accept or refuse bamlanivimab at any time.  • The significant known and potential risks and benefits of bamlanivimab and the extent to which such risks and benefits are unknown.  • Information on available alternative treatments and the risks and benefits of those alternatives have been shared.    Follow up as needed

## 2021-11-26 ENCOUNTER — HOSPITAL ENCOUNTER (OUTPATIENT)
Dept: INFUSION THERAPY | Facility: HOSPITAL | Age: 46
Setting detail: INFUSION SERIES
Discharge: HOME | End: 2021-11-26

## 2021-11-26 VITALS
SYSTOLIC BLOOD PRESSURE: 115 MMHG | DIASTOLIC BLOOD PRESSURE: 76 MMHG | RESPIRATION RATE: 18 BRPM | OXYGEN SATURATION: 94 % | TEMPERATURE: 98.3 F | HEART RATE: 85 BPM

## 2021-11-26 PROCEDURE — 25010000002 INJECTION, CASIRIVIMAB AND IMDEVIMAB, 1200 MG: Performed by: NURSE PRACTITIONER

## 2021-11-26 PROCEDURE — M0243 CASIRIVI AND IMDEVI INFUSION: HCPCS | Performed by: NURSE PRACTITIONER

## 2021-11-26 RX ORDER — DIPHENHYDRAMINE HYDROCHLORIDE 50 MG/ML
50 INJECTION INTRAMUSCULAR; INTRAVENOUS ONCE AS NEEDED
Status: DISCONTINUED | OUTPATIENT
Start: 2021-11-26 | End: 2021-11-28 | Stop reason: HOSPADM

## 2021-11-26 RX ORDER — DIPHENHYDRAMINE HCL 50 MG
50 CAPSULE ORAL ONCE AS NEEDED
Status: DISCONTINUED | OUTPATIENT
Start: 2021-11-26 | End: 2021-11-28 | Stop reason: HOSPADM

## 2021-11-26 RX ORDER — EPINEPHRINE 1 MG/ML
0.3 INJECTION, SOLUTION, CONCENTRATE INTRAVENOUS AS NEEDED
Status: DISCONTINUED | OUTPATIENT
Start: 2021-11-26 | End: 2021-11-28 | Stop reason: HOSPADM

## 2021-11-26 RX ADMIN — CASIRIVIMAB AND IMDEVIMAB 300 MG: 600; 600 INJECTION, SOLUTION, CONCENTRATE INTRAVENOUS at 08:16

## 2021-11-26 NOTE — NURSING NOTE
"Pt arrived to [facility] for SQ regeneron per MD order. Pt given handout titled, \"Fact Sheet for Patients, Parents and Caregivers: Emergency Use Authorization of Regen-cov\" prior to administration of SQ regeneron. RN educated pt on injection process, to not rcv any covid vaccine for 90 days, to go to ER for any issues with worsening breathing and to call PCP if symptoms are not improving. Pt vu. Pt denies any questions at this time.     0820-After second regeneron injection in R arm, pt noted to be diaphoretic, pale and c/o being light headed. BP 79/61. O2 91%. Denies chest pain. Denies difficulty breathing.     0823-Rapid Response called by RN    After a few mins, pt reportes \"feeling better\". Pale color and diaphoresis resolved. /88, O2 91.    0838-Pt reports all symptoms has resolved and requests to finish remaining 2 injections of regeneron. Pt tolerated both injections without any issues or complaints. BP noted at end of injections to be 119/86, O2 97.    Pt instructed to remain in room for 1 hour for post monitoring period. Call light within reach.    0945- Post monitoring complete. VS obtained and documented. No change in pt assessment. AVS given to pt. Pt escorted to private entrance and discharged in stable condition.     "

## 2021-11-30 ENCOUNTER — TELEPHONE (OUTPATIENT)
Dept: INTERNAL MEDICINE | Facility: CLINIC | Age: 46
End: 2021-11-30

## 2021-11-30 NOTE — TELEPHONE ENCOUNTER
Caller: Manuel Bellamy    Relationship: Self    Best call back number: [phone]    Who is your current provider: KUSHAL PATE    Who would you like your new provider to be: MARCELLO VENTURA    What are your reasons for transferring care: PATIENT STATES THAT HE HAS BEEN ABLE TO CONNECT WITH MARCELLO VENTURA VIA CONFERENCE CALLS AND APPOINTMENTS EASIER AND WOULD LIKE TO SWITCH PRIMARY CARE PROVIDERS TO MS VENTURA IF POSSIBLE      Additional notes: PLEASE CALL TO SCHEDULE NEW PATIENT APPOINTMENT IF ABLE TO DO THE SWITCH.

## 2022-03-31 ENCOUNTER — OFFICE VISIT (OUTPATIENT)
Dept: FAMILY MEDICINE CLINIC | Facility: CLINIC | Age: 47
End: 2022-03-31

## 2022-03-31 VITALS
OXYGEN SATURATION: 98 % | WEIGHT: 233 LBS | SYSTOLIC BLOOD PRESSURE: 136 MMHG | DIASTOLIC BLOOD PRESSURE: 80 MMHG | HEIGHT: 72 IN | HEART RATE: 90 BPM | TEMPERATURE: 98.9 F | BODY MASS INDEX: 31.56 KG/M2 | RESPIRATION RATE: 16 BRPM

## 2022-03-31 DIAGNOSIS — Z76.89 ENCOUNTER TO ESTABLISH CARE: Primary | ICD-10-CM

## 2022-03-31 DIAGNOSIS — J30.89 PERENNIAL ALLERGIC RHINITIS WITH SEASONAL VARIATION: ICD-10-CM

## 2022-03-31 DIAGNOSIS — F41.9 ANXIETY: ICD-10-CM

## 2022-03-31 DIAGNOSIS — J30.2 PERENNIAL ALLERGIC RHINITIS WITH SEASONAL VARIATION: ICD-10-CM

## 2022-03-31 PROCEDURE — 99213 OFFICE O/P EST LOW 20 MIN: CPT | Performed by: NURSE PRACTITIONER

## 2022-03-31 RX ORDER — MONTELUKAST SODIUM 10 MG/1
10 TABLET ORAL EVERY EVENING
Qty: 90 TABLET | Refills: 3 | Status: SHIPPED | OUTPATIENT
Start: 2022-03-31

## 2022-03-31 RX ORDER — LORATADINE 10 MG/1
CAPSULE, LIQUID FILLED ORAL
COMMUNITY
End: 2022-03-31 | Stop reason: SDUPTHER

## 2022-03-31 RX ORDER — LORATADINE 10 MG/1
10 CAPSULE, LIQUID FILLED ORAL DAILY
Qty: 90 EACH | Refills: 3 | Status: SHIPPED | OUTPATIENT
Start: 2022-03-31

## 2022-03-31 RX ORDER — CITALOPRAM 20 MG/1
10 TABLET ORAL DAILY
Qty: 45 TABLET | Refills: 3 | Status: SHIPPED | OUTPATIENT
Start: 2022-03-31

## 2022-03-31 RX ORDER — FLUTICASONE PROPIONATE 50 MCG
2 SPRAY, SUSPENSION (ML) NASAL DAILY
Qty: 16 G | Refills: 2 | Status: SHIPPED | OUTPATIENT
Start: 2022-03-31 | End: 2023-02-23

## 2022-03-31 RX ORDER — FLUTICASONE PROPIONATE 50 MCG
2 SPRAY, SUSPENSION (ML) NASAL DAILY
COMMUNITY
End: 2022-03-31 | Stop reason: SDUPTHER

## 2022-03-31 NOTE — PROGRESS NOTES
Patient ID: Manuel Bellamy is a 47 y.o. male     Patient Care Team:  Head, SACHIN Alexander as PCP - General (Nurse Practitioner)    Subjective     Chief Complaint   Patient presents with   • Establish Care   • Allergies       History of Present Illness    Manuel Bellamy presents to [facility] today to establish care with our practice.  His primary concern is problems with seasonal allergies.  He is in need of refill on his medications which consist of Singulair, loratadine, and Flonase.  He is usually well controlled on this current medication regimen however has had recent flareup of allergies with sinus congestion.  He also has albuterol inhaler prescribed to him to be used as needed for exertional asthma.  He also takes citalopram 10 mg daily for management of mood.  Denies any anxiety or depression.    Last physical completed 7/6/2021.  Noted elevated cholesterol levels.  He has been trying to manage with diet.  Cologuard 7/18/2021 negative.  Tested positive for Covid on 11/18/2021.  Received monoclonal antibody infusion.    He is unvaccinated against Covid.    He denies any complaints of fever, chills, cough, chest pain, shortness of air, abdominal pain, nausea, or any other concerns.     The following portions of the patient's history were reviewed and updated as appropriate: allergies, current medications, past family history, past medical history, past social history, past surgical history and problem list.       ROS    Vitals:    03/31/22 1353   BP: 136/80   Pulse: 90   Resp: 16   Temp: 98.9 °F (37.2 °C)   SpO2: 98%       Documented weights    03/31/22 1353   Weight: 106 kg (233 lb)     Body mass index is 31.6 kg/m².    Results for orders placed or performed in visit on 12/19/20   Comprehensive Metabolic Panel    Specimen: Blood   Result Value Ref Range    Glucose 95 65 - 99 mg/dL    BUN 15 6 - 20 mg/dL    Creatinine 0.91 0.76 - 1.27 mg/dL    eGFR Non African Am 89 >60  mL/min/1.73    eGFR African Am 108 >60 mL/min/1.73    BUN/Creatinine Ratio 16.5 7.0 - 25.0    Sodium 141 136 - 145 mmol/L    Potassium 4.4 3.5 - 5.2 mmol/L    Chloride 102 98 - 107 mmol/L    Total CO2 27.0 22.0 - 29.0 mmol/L    Calcium 9.4 8.6 - 10.5 mg/dL    Total Protein 6.9 6.0 - 8.5 g/dL    Albumin 4.60 3.50 - 5.20 g/dL    Globulin 2.3 gm/dL    A/G Ratio 2.0 g/dL    Total Bilirubin 0.6 0.0 - 1.2 mg/dL    Alkaline Phosphatase 83 39 - 117 U/L    AST (SGOT) 22 1 - 40 U/L    ALT (SGPT) 29 1 - 41 U/L   Lipid Panel With / Chol / HDL Ratio    Specimen: Blood   Result Value Ref Range    Total Cholesterol 215 (H) 0 - 200 mg/dL    Triglycerides 276 (H) 0 - 150 mg/dL    HDL Cholesterol 38 (L) 40 - 60 mg/dL    VLDL Cholesterol Russell 49 (H) 5 - 40 mg/dL    LDL Chol Calc (NIH) 128 (H) 0 - 100 mg/dL    Chol/HDL Ratio 5.66    Vitamin D 25 Hydroxy    Specimen: Blood   Result Value Ref Range    25 Hydroxy, Vitamin D 32.5 30.0 - 100.0 ng/ml   Hepatitis C Antibody    Specimen: Blood   Result Value Ref Range    Hep C Virus Ab <0.1 0.0 - 0.9 s/co ratio           Objective     Physical Exam  Constitutional:       Appearance: He is well-developed.   HENT:      Head: Normocephalic and atraumatic.      Right Ear: Tympanic membrane normal.      Left Ear: Tympanic membrane normal.      Nose: Congestion present.      Mouth/Throat:      Mouth: Mucous membranes are moist.      Comments: PND noted  Eyes:      Pupils: Pupils are equal, round, and reactive to light.   Cardiovascular:      Rate and Rhythm: Normal rate and regular rhythm.      Heart sounds: Normal heart sounds. No murmur heard.  Pulmonary:      Effort: Pulmonary effort is normal.      Breath sounds: Normal breath sounds. No wheezing, rhonchi or rales.   Abdominal:      Palpations: Abdomen is soft.   Musculoskeletal:         General: No tenderness. Normal range of motion.      Cervical back: Normal range of motion and neck supple.   Skin:     General: Skin is warm and dry.       Findings: No erythema or rash.   Neurological:      Mental Status: He is alert and oriented to person, place, and time.   Psychiatric:         Behavior: Behavior normal.            Assessment/Plan     Assessment/Plan     Diagnoses and all orders for this visit:    1. Encounter to establish care (Primary)    2. Perennial allergic rhinitis with seasonal variation  -     montelukast (SINGULAIR) 10 MG tablet; Take 1 tablet by mouth Every Evening.  Dispense: 90 tablet; Refill: 3    3. Anxiety  Comments:  - stable  - RF SSRI  Orders:  -     citalopram (CeleXA) 20 MG tablet; Take 0.5 tablets by mouth Daily.  Dispense: 45 tablet; Refill: 3    4. Perennial allergic rhinitis with seasonal variation  Comments:  - controlled  - RF singulair  Orders:  -     montelukast (SINGULAIR) 10 MG tablet; Take 1 tablet by mouth Every Evening.  Dispense: 90 tablet; Refill: 3    Other orders  -     Loratadine (Claritin) 10 MG capsule; Take 1 capsule by mouth Daily.  Dispense: 90 each; Refill: 3  -     fluticasone (FLONASE) 50 MCG/ACT nasal spray; 2 sprays into the nostril(s) as directed by provider Daily.  Dispense: 16 g; Refill: 2  -     Chlorcyclizine-Pseudoephed 25-60 MG tablet; Take 1 tablet by mouth Every 8 (Eight) Hours As Needed (sinus congestion).  Dispense: 30 tablet; Refill: 2          Summary:  Manuel Bellamy presents office today to establish care with our practice.  Primarily needs refill on his prescribed medications.  He also has had a flareup of his allergies.  Advised to try using Stahist 1 tablet every 8 hours as needed for sinus congestion.  Continue Singulair and Claritin as directed along with Flonase nasal spray.  Instructed to let us know if the symptoms do not improve with these measures.  We will have him follow-up for his next annual physical in July with fasting labs a week before.    In the meantime, instructed to contact us sooner for any problems or concerns.    Follow Up:  Return in about 4 months (around  7/31/2022) for Annual with fasting labs the week before.  .    Patient was given instructions and counseling regarding condition or for health maintenance advice.  Please see specific information pulled into the AVS if appropriate.      Patient was wearing facemask when I entered the room and throughout our encounter. Protective equipment was worn throughout this patient encounter including a face mask, eye protection,  and gloves. Hand hygiene was performed before donning protective equipment and after removal when leaving the room.     Darya Bain, APRN  Family Medicine  [facility] [place]  03/31/22  15:30 EDT

## 2022-04-01 ENCOUNTER — PATIENT ROUNDING (BHMG ONLY) (OUTPATIENT)
Dept: FAMILY MEDICINE CLINIC | Facility: CLINIC | Age: 47
End: 2022-04-01

## 2022-07-08 ENCOUNTER — OFFICE VISIT (OUTPATIENT)
Dept: FAMILY MEDICINE CLINIC | Facility: CLINIC | Age: 47
End: 2022-07-08

## 2022-07-08 VITALS
TEMPERATURE: 99.1 F | DIASTOLIC BLOOD PRESSURE: 80 MMHG | WEIGHT: 227 LBS | HEIGHT: 72 IN | HEART RATE: 102 BPM | RESPIRATION RATE: 16 BRPM | SYSTOLIC BLOOD PRESSURE: 130 MMHG | OXYGEN SATURATION: 99 % | BODY MASS INDEX: 30.75 KG/M2

## 2022-07-08 DIAGNOSIS — L30.9 DERMATITIS: ICD-10-CM

## 2022-07-08 DIAGNOSIS — B37.49 CANDIDIASIS OF PERINEUM: Primary | ICD-10-CM

## 2022-07-08 DIAGNOSIS — S76.212A STRAIN OF LEFT GROIN: ICD-10-CM

## 2022-07-08 PROCEDURE — 99213 OFFICE O/P EST LOW 20 MIN: CPT | Performed by: NURSE PRACTITIONER

## 2022-07-08 RX ORDER — KETOCONAZOLE 20 MG/G
1 CREAM TOPICAL DAILY
Qty: 60 G | Refills: 0 | Status: SHIPPED | OUTPATIENT
Start: 2022-07-08

## 2022-07-08 RX ORDER — METHYLPREDNISOLONE 4 MG/1
TABLET ORAL
Qty: 21 EACH | Refills: 0 | Status: SHIPPED | OUTPATIENT
Start: 2022-07-08 | End: 2022-08-22

## 2022-07-08 NOTE — PROGRESS NOTES
Patient ID: Manuel Bellamy is a 47 y.o. male     Patient Care Team:  Head, SACHIN Alexander as PCP - General (Nurse Practitioner)    Subjective     Chief Complaint   Patient presents with   • Groin Pain     Left   • Itching     FEET       History of Present Illness    Manuel Bellamy presents to [facility] today for complaints of left groin pain and rash.      Left groin pain intermittent for about 3 weeks.  [place] occurrence [place] [employer].  He was active.  Worse with movement.    Rectal itching for months.    Itching to groin area and feet.  Has tried OTC cream.  No relief.    Feet will itch mainly at night.    No new detergents, soaps, or other agents.  No new foods.  No one at home with similar rash.    He denies any complaints of fever, chills, cough, chest pain, shortness of air, abdominal pain, nausea, or any other concerns.     The following portions of the patient's history were reviewed and updated as appropriate: allergies, current medications, past family history, past medical history, past social history, past surgical history and problem list.       ROS    Vitals:    07/08/22 0852   BP: 130/80   Pulse: 102   Resp: 16   Temp: 99.1 °F (37.3 °C)   SpO2: 99%       Documented weights    07/08/22 0852   Weight: 103 kg (227 lb)     Body mass index is 30.79 kg/m².    Results for orders placed or performed in visit on 12/19/20   Comprehensive Metabolic Panel    Specimen: Blood   Result Value Ref Range    Glucose 95 65 - 99 mg/dL    BUN 15 6 - 20 mg/dL    Creatinine 0.91 0.76 - 1.27 mg/dL    eGFR Non African Am 89 >60 mL/min/1.73    eGFR African Am 108 >60 mL/min/1.73    BUN/Creatinine Ratio 16.5 7.0 - 25.0    Sodium 141 136 - 145 mmol/L    Potassium 4.4 3.5 - 5.2 mmol/L    Chloride 102 98 - 107 mmol/L    Total CO2 27.0 22.0 - 29.0 mmol/L    Calcium 9.4 8.6 - 10.5 mg/dL    Total Protein 6.9 6.0 - 8.5 g/dL    Albumin 4.60 3.50 - 5.20 g/dL    Globulin 2.3 gm/dL    A/G Ratio 2.0  g/dL    Total Bilirubin 0.6 0.0 - 1.2 mg/dL    Alkaline Phosphatase 83 39 - 117 U/L    AST (SGOT) 22 1 - 40 U/L    ALT (SGPT) 29 1 - 41 U/L   Lipid Panel With / Chol / HDL Ratio    Specimen: Blood   Result Value Ref Range    Total Cholesterol 215 (H) 0 - 200 mg/dL    Triglycerides 276 (H) 0 - 150 mg/dL    HDL Cholesterol 38 (L) 40 - 60 mg/dL    VLDL Cholesterol Russell 49 (H) 5 - 40 mg/dL    LDL Chol Calc (NIH) 128 (H) 0 - 100 mg/dL    Chol/HDL Ratio 5.66    Vitamin D 25 Hydroxy    Specimen: Blood   Result Value Ref Range    25 Hydroxy, Vitamin D 32.5 30.0 - 100.0 ng/ml   Hepatitis C Antibody    Specimen: Blood   Result Value Ref Range    Hep C Virus Ab <0.1 0.0 - 0.9 s/co ratio           Objective     Physical Exam  Vitals reviewed. Exam conducted with a chaperone present (Andie Guzman CMA).   Constitutional:       General: He is not in acute distress.  Cardiovascular:      Rate and Rhythm: Normal rate.   Pulmonary:      Effort: Pulmonary effort is normal.   Abdominal:      Hernia: There is no hernia in the left inguinal area or right inguinal area.   Genitourinary:     Rectum: No external hemorrhoid.      Comments: Tenderness to left groin area.   Flat erythema rash noted to crease between buttocks.    Lymphadenopathy:      Lower Body: No right inguinal adenopathy. No left inguinal adenopathy.   Skin:     Findings: Rash present.      Comments: Few areas to feet scattered erythema.     Neurological:      Mental Status: He is alert and oriented to person, place, and time.            Assessment & Plan     Assessment/Plan     Diagnoses and all orders for this visit:    1. Candidiasis of perineum (Primary)  -     ketoconazole (NIZORAL) 2 % cream; Apply 1 application topically to the appropriate area as directed Daily.  Dispense: 60 g; Refill: 0    2. Dermatitis  -     methylPREDNISolone (MEDROL) 4 MG dose pack; Take as directed on package instructions.  Dispense: 21 each; Refill: 0    3. Strain of left groin   Medrol dose  pack as directed.  Heating pad to area and OTC NSAIDs as directed.  Let us know if no improvement with these measures.      In the meantime, instructed to contact us sooner for any problems or concerns.    Follow Up:  Return if symptoms worsen or fail to improve, for Next scheduled follow up.    Patient was given instructions and counseling regarding condition or for health maintenance advice.  Please see specific information pulled into the AVS if appropriate.      Patient was wearing facemask when I entered the room and throughout our encounter. Protective equipment was worn throughout this patient encounter including a face mask, eye protection,  and gloves. Hand hygiene was performed before donning protective equipment and after removal when leaving the room.     Darya Bain, APRN  Family Medicine  [facility] [place]  07/08/22  17:53 EDT

## 2022-08-04 DIAGNOSIS — Z00.00 ENCOUNTER FOR HEALTH MAINTENANCE EXAMINATION IN ADULT: Primary | ICD-10-CM

## 2022-08-05 DIAGNOSIS — Z00.00 ENCOUNTER FOR HEALTH MAINTENANCE EXAMINATION IN ADULT: ICD-10-CM

## 2022-08-08 ENCOUNTER — TELEPHONE (OUTPATIENT)
Dept: FAMILY MEDICINE CLINIC | Facility: CLINIC | Age: 47
End: 2022-08-08

## 2022-08-08 DIAGNOSIS — R10.32 LEFT GROIN PAIN: Primary | ICD-10-CM

## 2022-08-08 NOTE — TELEPHONE ENCOUNTER
Caller: Manuel Bellamy    Relationship: Self    Best call back number: [phone]     What orders are you requesting (i.e. lab or imaging): IMAGING    In what timeframe would the patient need to come in: AS SOON AS AVAILABLE    Where will you receive your lab/imaging services: ANY    Additional notes: PATIENT REPORTS PERSISTENT GROIN PAIN, CONTINUING FROM APPOINTMENT ON 07/08/22.    HE SAYS THAT HE HAS A CONSTANT DULL PAIN, BUT WHEN HAVING TO STAND FOR EXTENDED PERIODS HE SAYS THAT HE HAS MOMENTS OF FLAIR UPS WHERE THE PAIN IS VERY SHARP.

## 2022-08-08 NOTE — TELEPHONE ENCOUNTER
I have placed order for CT abdomen and pelvis with oral contrast for evaluation of possible inguinal hernia causing his left groin pain.  He will be contacted once scheduled.

## 2022-08-12 DIAGNOSIS — Z11.52 ENCOUNTER FOR SCREENING FOR COVID-19: Primary | ICD-10-CM

## 2022-08-13 LAB
ALBUMIN SERPL-MCNC: 4.7 G/DL (ref 4–5)
ALBUMIN/GLOB SERPL: 2 {RATIO} (ref 1.2–2.2)
ALP SERPL-CCNC: 85 IU/L (ref 44–121)
ALT SERPL-CCNC: 30 IU/L (ref 0–44)
AST SERPL-CCNC: 25 IU/L (ref 0–40)
BILIRUB SERPL-MCNC: 0.4 MG/DL (ref 0–1.2)
BUN SERPL-MCNC: 15 MG/DL (ref 6–24)
BUN/CREAT SERPL: 16 (ref 9–20)
CALCIUM SERPL-MCNC: 9.6 MG/DL (ref 8.7–10.2)
CHLORIDE SERPL-SCNC: 101 MMOL/L (ref 96–106)
CHOLEST SERPL-MCNC: 223 MG/DL (ref 100–199)
CO2 SERPL-SCNC: 23 MMOL/L (ref 20–29)
CREAT SERPL-MCNC: 0.91 MG/DL (ref 0.76–1.27)
EGFRCR SERPLBLD CKD-EPI 2021: 104 ML/MIN/1.73
ERYTHROCYTE [DISTWIDTH] IN BLOOD BY AUTOMATED COUNT: 12.7 % (ref 11.6–15.4)
GLOBULIN SER CALC-MCNC: 2.3 G/DL (ref 1.5–4.5)
GLUCOSE SERPL-MCNC: 99 MG/DL (ref 65–99)
HCT VFR BLD AUTO: 47.5 % (ref 37.5–51)
HDLC SERPL-MCNC: 41 MG/DL
HGB BLD-MCNC: 16.2 G/DL (ref 13–17.7)
LDLC SERPL CALC-MCNC: 143 MG/DL (ref 0–99)
MCH RBC QN AUTO: 31.5 PG (ref 26.6–33)
MCHC RBC AUTO-ENTMCNC: 34.1 G/DL (ref 31.5–35.7)
MCV RBC AUTO: 92 FL (ref 79–97)
PLATELET # BLD AUTO: 277 X10E3/UL (ref 150–450)
POTASSIUM SERPL-SCNC: 4.9 MMOL/L (ref 3.5–5.2)
PROT SERPL-MCNC: 7 G/DL (ref 6–8.5)
RBC # BLD AUTO: 5.15 X10E6/UL (ref 4.14–5.8)
SODIUM SERPL-SCNC: 139 MMOL/L (ref 134–144)
TRIGL SERPL-MCNC: 215 MG/DL (ref 0–149)
TSH SERPL DL<=0.005 MIU/L-ACNC: 3.55 UIU/ML (ref 0.45–4.5)
VLDLC SERPL CALC-MCNC: 39 MG/DL (ref 5–40)
WBC # BLD AUTO: 6.7 X10E3/UL (ref 3.4–10.8)

## 2022-08-15 ENCOUNTER — HOSPITAL ENCOUNTER (OUTPATIENT)
Dept: CT IMAGING | Facility: HOSPITAL | Age: 47
Discharge: HOME | End: 2022-08-15
Admitting: NURSE PRACTITIONER

## 2022-08-15 DIAGNOSIS — R10.32 LEFT GROIN PAIN: ICD-10-CM

## 2022-08-15 LAB — SARS-COV-2 AB SERPL QL IA: POSITIVE

## 2022-08-15 PROCEDURE — 74176 CT ABD & PELVIS W/O CONTRAST: CPT

## 2022-08-22 ENCOUNTER — OFFICE VISIT (OUTPATIENT)
Dept: FAMILY MEDICINE CLINIC | Facility: CLINIC | Age: 47
End: 2022-08-22

## 2022-08-22 ENCOUNTER — TELEPHONE (OUTPATIENT)
Dept: FAMILY MEDICINE CLINIC | Facility: CLINIC | Age: 47
End: 2022-08-22

## 2022-08-22 VITALS
DIASTOLIC BLOOD PRESSURE: 80 MMHG | BODY MASS INDEX: 30.88 KG/M2 | TEMPERATURE: 98.9 F | HEART RATE: 76 BPM | WEIGHT: 228 LBS | SYSTOLIC BLOOD PRESSURE: 124 MMHG | OXYGEN SATURATION: 99 % | RESPIRATION RATE: 16 BRPM | HEIGHT: 72 IN

## 2022-08-22 DIAGNOSIS — Z00.00 ANNUAL PHYSICAL EXAM: Primary | ICD-10-CM

## 2022-08-22 DIAGNOSIS — R10.32 LEFT GROIN PAIN: ICD-10-CM

## 2022-08-22 DIAGNOSIS — E78.2 MIXED HYPERLIPIDEMIA: ICD-10-CM

## 2022-08-22 DIAGNOSIS — K40.20 BILATERAL INGUINAL HERNIA WITHOUT OBSTRUCTION OR GANGRENE, RECURRENCE NOT SPECIFIED: ICD-10-CM

## 2022-08-22 DIAGNOSIS — F41.9 ANXIETY: ICD-10-CM

## 2022-08-22 PROCEDURE — 99396 PREV VISIT EST AGE 40-64: CPT | Performed by: NURSE PRACTITIONER

## 2022-08-22 RX ORDER — ATORVASTATIN CALCIUM 10 MG/1
10 TABLET, FILM COATED ORAL DAILY
Qty: 90 TABLET | Refills: 3 | Status: SHIPPED | OUTPATIENT
Start: 2022-08-22 | End: 2023-02-27 | Stop reason: SDUPTHER

## 2022-08-22 NOTE — TELEPHONE ENCOUNTER
OK.  Will change.    Vero, Can you change referral to Dr. Frank Arroyo or whoever is available for general surgery.  Thanks.

## 2022-08-22 NOTE — PROGRESS NOTES
"Chief Complaint   Patient presents with   • Annual Exam       Patient Care Team:  Head, SACHIN Alexander as PCP - General (Nurse Practitioner)    Subjective   Manuel Bellamy is a 47 y.o. male and is here for a yearly physical exam. The patient reports no problems except for continued left inguinal pain which is intermittent.  Seems to occur when standing for long periods.  Becomes painful enough, he has to go sit down to relieve discomfort.  Seems to be occurring more often since last being seen for this.  He had CT abdomen and pelvis which was stable with bilateral fat containing inguinal hernias.      Do you take any herbs or supplements that were not prescribed by a doctor? no. If so, these will be added to active medication list.    Health Habits:  Dental Exam: Up to date  Eye Exam: Up to date  Diet: Tries to eat healthy  Exercise:   Current exercise activities include: Active with work   Colonoscopy: Monika 7/18/2021    The following portions of the patient's history were reviewed and updated as appropriate: allergies, current medications, past family history, past medical history, past social history, past surgical history and problem list.    Social and Family and Surgical History reviewed and updated today, see Rooming tab.    Health History, Preventive Measures and Vaccination flow sheets reviewed and updated today.    Patient's current medical chart in Epic; including previous office notes, imaging, labs, specialist's evaluation either in notes or in Media tab reviewed today.    Other pertinent medical information also reviewed thru Care Everywhere function is also reviewed today.    Review of Systems  Review of Systems  Vitals:    08/22/22 0922   BP: 124/80   BP Location: Left arm   Patient Position: Sitting   Cuff Size: Adult   Pulse: 76   Resp: 16   Temp: 98.9 °F (37.2 °C)   SpO2: 99%   Weight: 103 kg (228 lb)   Height: 182.9 cm (72\")     Wt Readings from Last 3 Encounters:   08/22/22 103 kg (228 lb) "   07/08/22 103 kg (227 lb)   03/31/22 106 kg (233 lb)     CT Abdomen Pelvis Without Contrast (08/15/2022 11:05)     General Appearance:  Alert, cooperative, no distress, appears stated age   Head:  Normocephalic, without obvious abnormality, atraumatic   Eyes:  PERRL, conjunctiva/corneas clear, EOM's intact.   Ears:  Normal TM's and external ear canals, both ears   Nose: Nares normal, septum midline, mucosa normal, no drainage or sinus tenderness   Throat: Lips, mucosa, and tongue normal; teeth and gums normal   Neck: Supple, symmetrical, trachea midline, no adenopathy;   thyroid: No enlargement/tenderness/nodules       Lungs:  Clear to auscultation bilaterally, respirations even and unlabored   Chest wall:  No tenderness or deformity   Heart:  Regular rate and rhythm, S1 and S2 normal, no murmur, rub or gallop   Abdomen:  Soft, non-tender, bowel sounds active all four quadrants,   no masses, no organomegaly           Extremities: Extremities normal, atraumatic, no cyanosis or edema   Pulses: 2+ and symmetric all extremities   Skin: Skin color, texture, turgor normal, no rashes or lesions   Lymph nodes: Cervical, supraclavicular, and axillary nodes normal   Neurologic: CNII-XII intact. Normal strength, sensation and reflexes   throughout       Results for orders placed or performed in visit on 08/12/22   SARS-CoV-2 Antibodies, Nucleocapsid (Natural Immunity)    Specimen: Blood   Result Value Ref Range    SARS-COV-2 ANTIBODIES Positive Negative     Assessment & Plan   Healthy male exam.  Diagnoses and all orders for this visit:    1. Annual physical exam (Primary)    2. Left groin pain  -     Ambulatory Referral to General Surgery    3. Bilateral inguinal hernia without obstruction or gangrene, recurrence not specified  -     Ambulatory Referral to General Surgery    4. Mixed hyperlipidemia  -     Start atorvastatin (Lipitor) 10 MG tablet; Take 1 tablet by mouth Daily.  Dispense: 90 tablet; Refill: 3  -     Lipid  Panel With / Chol / HDL Ratio; Future  -     Comprehensive Metabolic Panel; Future    5. Anxiety   Continue Celexa as directed.      1. Manuel Bellamy has been doing well since he was last seen except for continued left-sided groin pain.  His symptoms are gradually worsening.  CT appeared stable however recommend further evaluation with general surgery to assure stability.  Patient agrees.  Reviewed recent labs along with patient which all appear stable except for continued elevated cholesterol levels.  This is with him trying to watch his diet.  He is active with work on a daily basis.  Recommend starting low-dose statin in which he agrees.  We will start him on atorvastatin 10 mg daily.  Continue to watch diet and limit cholesterol intake.  Adverse effects such as myalgias explained to patient.  We will repeat fasting lipid panel with CMP in 6 months and will call him with results.  If all are stable, return to office in 1 year for next annual physical with fasting labs.  In the meantime, instructed contact us sooner for any problems or concerns.  2. Patient Counseling:  --Nutrition: Stressed importance of moderation in sodium/caffeine intake, saturated fat and cholesterol.  Discussed caloric balance, sufficient intake of fresh fruits, vegetables, fiber,    calcium, iron.  --Exercise: Stressed the importance of regular exercise.   --Substance Abuse: Discussed cessation/primary prevention of tobacco, alcohol, or other drug use; driving or other dangerous activities under the influence.    --Dental health: Discussed importance of regular tooth brushing, flossing, and dental visits.  --Suggested having eyes and vision checked if needed or past due.  --Immunizations reviewed.  --Discussed benefits of screening colonoscopy.  3. Discussed the patient's BMI with him.  The BMI is above average; BMI management plan is completed  4. Follow up next physical in 1 year    Patient was given instructions and counseling  regarding condition or for health maintenance advice.  Please see specific information pulled into the AVS if appropriate.      Medications Discontinued During This Encounter   Medication Reason   • methylPREDNISolone (MEDROL) 4 MG dose pack *Therapy completed          Patient was wearing facemask when I entered the room and throughout our encounter. Protective equipment was worn by me throughout this patient encounter including a face mask.  Hand hygiene was performed before donning protective equipment and after removal when leaving the room.     Darya Bain, APRN  Family Practice  [facility] [place]

## 2022-08-22 NOTE — TELEPHONE ENCOUNTER
PATIENT CALLED STATING THAT RAYMON NINA HAD PLACED A REFERRAL FOR DR. ZELALEM ROSE FOR HIM.    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    PLEASE ADVISE  [phone]

## 2022-08-30 ENCOUNTER — OFFICE VISIT (OUTPATIENT)
Dept: SURGERY | Facility: CLINIC | Age: 47
End: 2022-08-30

## 2022-08-30 VITALS — WEIGHT: 224.6 LBS | BODY MASS INDEX: 30.42 KG/M2 | HEIGHT: 72 IN

## 2022-08-30 DIAGNOSIS — K40.20 NON-RECURRENT BILATERAL INGUINAL HERNIA WITHOUT OBSTRUCTION OR GANGRENE: Primary | ICD-10-CM

## 2022-08-30 PROCEDURE — 99203 OFFICE O/P NEW LOW 30 MIN: CPT | Performed by: SURGERY

## 2022-08-30 RX ORDER — LORATADINE 10 MG/1
TABLET ORAL
COMMUNITY
Start: 2022-08-01 | End: 2022-08-30

## 2022-08-30 NOTE — PROGRESS NOTES
Subjective   Manuel Bellamy is a 47 y.o. male who presents to the office in surgical consultation from [place], SACHIN Alexander for bilateral inguinal hernias.    History of Present Illness     The patient has noticed pain in his left groin that has been present for at least several months.  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  When he is standing, after about 10 minutes, he begins having burning pain in the left groin.  He does not have any symptoms in the right groin.  He does not notice his symptoms when he is walking or performing other activities.  He has not noticed a bulge.  There has been no change in his bowel or bladder function.    He was evaluated with a CT scan of the abdomen and pelvis that shows bilateral inguinal hernias that contain fat.    Review of Systems   Constitutional: Negative for fatigue and fever.   Respiratory: Negative for chest tightness and shortness of breath.    Cardiovascular: Negative for chest pain and palpitations.   Gastrointestinal: Positive for abdominal pain. Negative for blood in stool, constipation, diarrhea, nausea and vomiting.     Past Medical History:   Diagnosis Date   • Anxiety 10/12/2017   • Asthma Allergy asthma in fall   • Chronic seasonal allergic rhinitis 10/12/2017   • Depression 10/12/2017   • Mixed hyperlipidemia 11/16/2017   • Plantar fasciitis    • Psychophysiological insomnia 10/12/2017   • Vitamin D deficiency 10/12/2017     Past Surgical History:   Procedure Laterality Date   • APPENDECTOMY     • CHOLECYSTECTOMY       Family History   Problem Relation Age of Onset   • Hypertension Mother    • Endometrial cancer Mother    • Lung cancer Maternal Grandfather      Social History     Socioeconomic History   • Marital status:    Tobacco Use   • Smoking status: Never Smoker   • Smokeless tobacco: Never Used   Substance and Sexual Activity   • Alcohol use: Yes     Alcohol/week: 2.0 standard  drinks     Types: 2 Drinks containing 0.5 oz of alcohol per week     Comment: Social   • Drug use: Never   • Sexual activity: Yes     Partners: Female     Birth control/protection: Condom       Objective   Physical Exam  Constitutional:       Appearance: Normal appearance. He is well-developed. He is not toxic-appearing.   Eyes:      General: No scleral icterus.  Pulmonary:      Effort: Pulmonary effort is normal. No respiratory distress.   Abdominal:      Palpations: Abdomen is soft.      Tenderness: There is no abdominal tenderness.      Hernia: A hernia is present. Hernia is present in the left inguinal area and right inguinal area.      Comments: There are bilateral inguinal hernias that are reducible.  Both contain fat.  The right inguinal hernia is smaller than the left inguinal hernia.   Skin:     General: Skin is warm and dry.   Neurological:      Mental Status: He is alert and oriented to person, place, and time.   Psychiatric:         Behavior: Behavior normal.         Thought Content: Thought content normal.         Judgment: Judgment normal.         Assessment & Plan     1.  Bilateral inguinal hernias: The patient has bilateral inguinal hernias with the left side being larger than the right side.  Both contain fat.  The left side is symptomatic and, based on his symptoms of burning pain, proceeding with inguinal hernia repairs is appropriate.  This was discussed with him.  Approaches to inguinal hernia repairs were also discussed with him.  Based on the bilateral nature a laparoscopic or robotic approach is appropriate.  He will be scheduled for a da Ramiro robot-assisted laparoscopic bilateral inguinal hernia repair.  The patient understands the indications, alternatives, risks, and benefits of the procedure and wishes to proceed. He will contact our office when he is ready to schedule surgery.

## 2022-10-17 ENCOUNTER — TELEPHONE (OUTPATIENT)
Dept: FAMILY MEDICINE CLINIC | Facility: CLINIC | Age: 47
End: 2022-10-17

## 2022-10-17 NOTE — TELEPHONE ENCOUNTER
Caller: Manuel Bellamy    Relationship: Self    Best call back number: [phone]    What is the best time to reach you: ANY TIME    Who are you requesting to speak with (clinical staff, provider,  specific staff member): RAYMON NINA, CLINICAL    What was the call regarding: PATIENT STATED HE HAD A CT SCAN PREFORMED 6 WEEKS AGO, AND WHEN HE DISCUSSED THE RESULTS WITH RAYMON NINA, HE DID NOT REMEMBER HER MENTIONTIONING SCARRING FROM [place].    PATIENT STATED HE LOOKED OVER THE RESULTS RECENTLY, AND THAT THIS WORRIED HIM.    PATIENT STATED HE WOULD LIKE TO DISCUSS THIS WITH HER.    Do you require a callback: PLEASE CALL TO DISCUSS.

## 2022-10-18 ENCOUNTER — TELEPHONE (OUTPATIENT)
Dept: FAMILY MEDICINE CLINIC | Facility: CLINIC | Age: 47
End: 2022-10-18

## 2022-10-18 NOTE — TELEPHONE ENCOUNTER
"Pt called back, he missed your call.  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  He looked it up and it looks pretty serious.  He was unaware of having had this.  "

## 2023-02-23 RX ORDER — FLUTICASONE PROPIONATE 50 MCG
SPRAY, SUSPENSION (ML) NASAL
Qty: 16 G | Refills: 0 | Status: SHIPPED | OUTPATIENT
Start: 2023-02-23

## 2023-02-27 DIAGNOSIS — E78.2 MIXED HYPERLIPIDEMIA: ICD-10-CM

## 2023-02-27 RX ORDER — ATORVASTATIN CALCIUM 20 MG/1
20 TABLET, FILM COATED ORAL DAILY
Qty: 90 TABLET | Refills: 3 | Status: SHIPPED | OUTPATIENT
Start: 2023-02-27

## 2023-03-15 DIAGNOSIS — K40.20 NON-RECURRENT BILATERAL INGUINAL HERNIA WITHOUT OBSTRUCTION OR GANGRENE: Primary | ICD-10-CM

## 2023-03-16 ENCOUNTER — TELEPHONE (OUTPATIENT)
Dept: SURGERY | Facility: CLINIC | Age: 48
End: 2023-03-16
Payer: COMMERCIAL

## 2023-04-11 PROBLEM — K40.20 NON-RECURRENT BILATERAL INGUINAL HERNIA WITHOUT OBSTRUCTION OR GANGRENE: Status: ACTIVE | Noted: 2023-04-11

## 2023-04-12 DIAGNOSIS — F41.9 ANXIETY: ICD-10-CM

## 2023-04-12 RX ORDER — CITALOPRAM 20 MG/1
TABLET ORAL
Qty: 45 TABLET | Refills: 0 | Status: SHIPPED | OUTPATIENT
Start: 2023-04-12 | End: 2023-07-03

## 2023-04-13 ENCOUNTER — TELEPHONE (OUTPATIENT)
Dept: SURGERY | Facility: CLINIC | Age: 48
End: 2023-04-13
Payer: COMMERCIAL

## 2023-04-13 NOTE — TELEPHONE ENCOUNTER
The patient called and cancelled his surgery scheduled with Dr. Arroyo on 5/15/23. He will call back to reschedule.

## 2023-08-14 DIAGNOSIS — Z00.00 ANNUAL PHYSICAL EXAM: Primary | ICD-10-CM

## 2023-08-17 DIAGNOSIS — Z11.52 ENCOUNTER FOR SCREENING FOR COVID-19: ICD-10-CM

## 2023-08-17 DIAGNOSIS — Z11.52 ENCOUNTER FOR SCREENING FOR COVID-19: Primary | ICD-10-CM

## 2023-08-17 DIAGNOSIS — M25.579 ARTHRALGIA OF FOOT, UNSPECIFIED LATERALITY: ICD-10-CM

## 2023-08-25 ENCOUNTER — OFFICE VISIT (OUTPATIENT)
Dept: FAMILY MEDICINE CLINIC | Facility: CLINIC | Age: 48
End: 2023-08-25
Payer: COMMERCIAL

## 2023-08-25 VITALS
WEIGHT: 235 LBS | SYSTOLIC BLOOD PRESSURE: 122 MMHG | HEIGHT: 72 IN | TEMPERATURE: 97.5 F | HEART RATE: 82 BPM | OXYGEN SATURATION: 99 % | BODY MASS INDEX: 31.83 KG/M2 | DIASTOLIC BLOOD PRESSURE: 80 MMHG

## 2023-08-25 DIAGNOSIS — M54.50 MIDLINE LOW BACK PAIN, UNSPECIFIED CHRONICITY, UNSPECIFIED WHETHER SCIATICA PRESENT: ICD-10-CM

## 2023-08-25 DIAGNOSIS — E78.2 MIXED HYPERLIPIDEMIA: ICD-10-CM

## 2023-08-25 DIAGNOSIS — E55.9 VITAMIN D DEFICIENCY: ICD-10-CM

## 2023-08-25 DIAGNOSIS — Z00.00 ANNUAL PHYSICAL EXAM: Primary | ICD-10-CM

## 2023-08-25 DIAGNOSIS — M25.579 ARTHRALGIA OF FOOT, UNSPECIFIED LATERALITY: ICD-10-CM

## 2023-08-25 DIAGNOSIS — R79.89 ELEVATED TSH: ICD-10-CM

## 2023-08-25 DIAGNOSIS — F41.9 ANXIETY: ICD-10-CM

## 2023-08-25 NOTE — PROGRESS NOTES
Chief Complaint   Patient presents with    Annual Exam       Patient Care Team:  Head, SACHIN Alexander as PCP - General (Nurse Practitioner)    Subjective   Manuel Bellamy is a 48 y.o. male and is here for a yearly physical exam. The patient reports no problems.  Except chronic low back pain.  Followed by chiropractor which usually helps however does not seem to be helping.  Has taken ibuprofen as needed.      Also was having pain in left great toe.  Had called and requested uric acid level to assure not related to gout.  Symptoms have gradually improved.  At one point, he was having difficulty flex great toe.  No redness.      Hyperlipidemia:  Prescribed atorvastatin however admits he does not take on a routine basis.  Usually about 3 times per week.      Anxiety:  Managed with Celexa 10 mg daily.      Has seen Dr. Arroyo for evaluation of inguinal hernia.  Instructed to contact him when ready to proceed with surgery.  He will have flare ups of discomfort at times especially with standing for long periods.      Do you take any herbs or supplements that were not prescribed by a doctor? no. If so, these will be added to active medication list.    Health Habits:  Dental Exam: Up to date  Eye Exam: Up to date  Diet: Tries to eat healthy.  Feels like he has done better this year.    Exercise:   Current exercise activities include: Active with work   Colonoscopy: Monika 7/18/2021    The following portions of the patient's history were reviewed and updated as appropriate: allergies, current medications, past family history, past medical history, past social history, past surgical history, and problem list.    Social and Family and Surgical History reviewed and updated today, see Rooming tab.    Health History, Preventive Measures and Vaccination flow sheets reviewed and updated today.    Patient's current medical chart in Epic; including previous office notes, imaging, labs, specialist's evaluation either in notes or  "in Media tab reviewed today.    Other pertinent medical information also reviewed thru Care Everywhere function is also reviewed today.    Review of Systems  Review of Systems  Vitals:    08/25/23 0928   BP: 122/80   BP Location: Left arm   Patient Position: Sitting   Cuff Size: Adult   Pulse: 82   Temp: 97.5 øF (36.4 øC)   SpO2: 99%   Weight: 107 kg (235 lb)   Height: 182.9 cm (72\")     Wt Readings from Last 3 Encounters:   08/25/23 107 kg (235 lb)   08/30/22 102 kg (224 lb 9.6 oz)   08/22/22 103 kg (228 lb)       General Appearance:  Alert, cooperative, no distress, appears stated age   Head:  Normocephalic, without obvious abnormality, atraumatic   Eyes:  PERRL, conjunctiva/corneas clear, EOM's intact.   Ears:  Normal TM's and external ear canals, both ears   Nose: Nares normal, septum midline, mucosa normal, no drainage or sinus tenderness   Throat: Lips, mucosa, and tongue normal; teeth and gums normal   Neck: Supple, symmetrical, trachea midline, no adenopathy;   thyroid: No enlargement/tenderness/nodules       Lungs:  Clear to auscultation bilaterally, respirations even and unlabored   Chest wall:  No tenderness or deformity   Heart:  Regular rate and rhythm, S1 and S2 normal, no murmur, rub or gallop   Abdomen:  Soft, non-tender, bowel sounds active all four quadrants,   no masses, no organomegaly    Back:  tenderness to midline low back.  No radiation down legs.         Extremities: Extremities normal, atraumatic, no cyanosis or edema   Pulses: 2+ and symmetric all extremities   Skin: Skin color, texture, turgor normal, no rashes or lesions   Lymph nodes: Cervical and supraclavicular nodes normal   Neurologic: CNII-XII intact. Normal strength.       Patient's (Body mass index is 31.87 kg/mý.) indicates that they are obese (BMI >30) with health related conditions that include dyslipidemias . Weight is unchanged. BMI is is above average; BMI management plan is completed. We discussed portion control and " increasing exercise.     Results for orders placed or performed in visit on 08/19/23   CBC (No Diff)    Specimen: Blood   Result Value Ref Range    WBC 6.26 3.40 - 10.80 10*3/mm3    RBC 5.11 4.14 - 5.80 10*6/mm3    Hemoglobin 16.2 13.0 - 17.7 g/dL    Hematocrit 46.1 37.5 - 51.0 %    MCV 90.2 79.0 - 97.0 fL    MCH 31.7 26.6 - 33.0 pg    MCHC 35.1 31.5 - 35.7 g/dL    RDW 12.8 12.3 - 15.4 %    Platelets 267 140 - 450 10*3/mm3   Comprehensive Metabolic Panel    Specimen: Blood   Result Value Ref Range    Glucose 98 65 - 99 mg/dL    BUN 20 6 - 20 mg/dL    Creatinine 0.91 0.76 - 1.27 mg/dL    EGFR Result 103.3 >60.0 mL/min/1.73    BUN/Creatinine Ratio 22.0 7.0 - 25.0    Sodium 136 136 - 145 mmol/L    Potassium 4.4 3.5 - 5.2 mmol/L    Chloride 100 98 - 107 mmol/L    Total CO2 25.1 22.0 - 29.0 mmol/L    Calcium 9.9 8.6 - 10.5 mg/dL    Total Protein 7.0 6.0 - 8.5 g/dL    Albumin 4.5 3.5 - 5.2 g/dL    Globulin 2.5 gm/dL    A/G Ratio 1.8 g/dL    Total Bilirubin 0.5 0.0 - 1.2 mg/dL    Alkaline Phosphatase 83 39 - 117 U/L    AST (SGOT) 32 1 - 40 U/L    ALT (SGPT) 37 1 - 41 U/L   Lipid Panel With / Chol / HDL Ratio    Specimen: Blood   Result Value Ref Range    Total Cholesterol 231 (H) 0 - 200 mg/dL    Triglycerides 326 (H) 0 - 150 mg/dL    HDL Cholesterol 38 (L) 40 - 60 mg/dL    VLDL Cholesterol Russell 59 (H) 5 - 40 mg/dL    LDL Chol Calc (NIH) 134 (H) 0 - 100 mg/dL    Chol/HDL Ratio 6.08    TSH Rfx On Abnormal To Free T4    Specimen: Blood   Result Value Ref Range    TSH 5.250 (H) 0.270 - 4.200 uIU/mL   UA / M With / Rflx Culture([facility] ONLY) - Urine, Clean Catch    Specimen: Urine, Clean Catch   Result Value Ref Range    Specific Gravity, UA 1.023 1.005 - 1.030    pH, UA 6.0 5.0 - 7.5    Color, UA Yellow Yellow    Appearance, UA Clear Clear    Leukocytes, UA Negative Negative    Protein Negative Negative/Trace    Glucose, UA Negative Negative    Ketones Negative Negative    Blood, UA Negative Negative    Bilirubin, UA Negative  Negative    Urobilinogen, UA 0.2 0.2 - 1.0 mg/dL    Nitrite, UA Negative Negative    Microscopic Examination Comment     Microscopic Examination See below:     Urinalysis Reflex Comment    Microscopic Examination -   Result Value Ref Range    WBC, UA None seen 0 - 5 /hpf    RBC, UA None seen 0 - 2 /hpf    Epithelial Cells (non renal) None seen 0 - 10 /hpf    Casts None seen None seen /lpf    Bacteria, UA None seen None seen/Few /hpf   SARS-CoV-2 Antibodies, Nucleocapsid (Natural Immunity)   Result Value Ref Range    SARS-COV-2 ANTIBODIES Positive Negative   T4F   Result Value Ref Range    Free T4 1.05 0.93 - 1.70 ng/dL   Uric Acid   Result Value Ref Range    Uric Acid 6.2 3.4 - 7.0 mg/dL     Assessment & Plan   Healthy male exam.  Diagnoses and all orders for this visit:    1. Annual physical exam (Primary)    2. Mixed hyperlipidemia   Needs to take atorvastatin daily as directed.   Start OTC fish oil 2000 mg daily to help with elevated triglycerides.      3. Arthralgia of foot, unspecified laterality   Rest, ice.  Topical Voltaren gel as directed.  Sample given in office.      4. Anxiety   Continue Celexa as directed.    5. Midline low back pain, unspecified chronicity, unspecified whether sciatica present   Monitor.  Offered medrol dose pack however does not feel needed at this time.  Rest, heating pad  to area.  OTC ibuprofen as needed.  He also has topical pain relieving cream to use.      6. Vitamin D deficiency  -     Vitamin D,25-Hydroxy; Future    7. Elevated TSH  -     TSH; Future  -     T4, Free; Future  -     T3, Free; Future      1. Manuel Bellamy has been doing well since he was last seen except for continued problems with low back pain.  Usually this is controlled with chiropractor however seems to not be as effective.  No recent injury.  He plans to continue to monitor.  Rest, heating pad, over-the-counter ibuprofen as needed.  Continuing the topical pain reliever cream as directed.  He is let us  know if symptoms worsen.  Reviewed recent labs along with patient which showed continued elevated cholesterol levels however admits to not taking his atorvastatin like he should.  He plans to start taking on a daily basis as directed.  Continue to watch diet limit cholesterol.  Blood pressure stable at 122/80.  His TSH level was elevated which is unusual for him.  He has fatigue however has been a chronic problem.  Advised we will continue to monitor for now especially since free T4 is normal.  We will repeat thyroid function test in about 2 months and notify him of results.  This will determine further recommendations.  2. Patient Counseling:  --Nutrition: Stressed importance of moderation in sodium/caffeine intake, saturated fat and cholesterol.  Discussed caloric balance, sufficient intake of fresh fruits, vegetables, fiber,    calcium, iron.  --Exercise: Stressed the importance of regular exercise.   --Substance Abuse: Discussed cessation/primary prevention of tobacco, alcohol, or other drug use; driving or other dangerous activities under the influence.    --Dental health: Discussed importance of regular tooth brushing, flossing, and dental visits.  --Suggested having eyes and vision checked if needed or past due.  --Immunizations reviewed.  --Discussed benefits of screening colonoscopy.  3. Discussed the patient's BMI with him.  The BMI is above average; BMI management plan is completed  4. Follow up next physical in 1 year    Patient was given instructions and counseling regarding condition or for health maintenance advice.  Please see specific information pulled into the AVS if appropriate.      Medications Discontinued During This Encounter   Medication Reason    Multiple Vitamins-Minerals (Centrum Flavor Burst) chewable tablet *Therapy completed    montelukast (SINGULAIR) 10 MG tablet *Therapy completed    melatonin 5 MG tablet tablet *Therapy completed    ketoconazole (NIZORAL) 2 % cream *Therapy  completed          Darya Head, APRN  Family Practice  [facility] [place]

## 2023-09-08 RX ORDER — LORATADINE 10 MG/1
10 CAPSULE, LIQUID FILLED ORAL DAILY
Qty: 90 EACH | Refills: 3 | Status: SHIPPED | OUTPATIENT
Start: 2023-09-08

## 2023-09-08 NOTE — TELEPHONE ENCOUNTER
"Caller: Manuel Bellamy \"TORITO\"    Relationship: Self    Best call back number: [phone]     Requested Prescriptions:   Requested Prescriptions     Pending Prescriptions Disp Refills    Loratadine (Claritin) 10 MG capsule 90 each 3     Sig: Take 1 capsule by mouth Daily.        Pharmacy where request should be sent: [facility] [address] [place], [place] [address] [facility] [phone] [facility] [phone] FX     Last office visit with prescribing clinician: 8/25/2023   Last telemedicine visit with prescribing clinician: Visit date not found   Next office visit with prescribing clinician: 9/3/2024     Additional details provided by patient: PATIENT IS OUT OF THIS MEDICATION AND STATES PHARMACY HAS TRIED TO CONTACT OFFICE FOR ADDITIONAL REFILLS AS THERE ARE NONE LEFT ON PRESCRIPTION.     PLEASE SEND IN WITH ADDITIONAL REFILLS IF POSSIBLE    Does the patient have less than a 3 day supply:  [x] Yes  [] No    Would you like a call back once the refill request has been completed: [] Yes [x] No    If the office needs to give you a call back, can they leave a voicemail: [x] Yes [] No    Dunia Dutta Rep   09/08/23 09:29 EDT       "

## 2023-10-06 DIAGNOSIS — F41.9 ANXIETY: ICD-10-CM

## 2023-10-06 RX ORDER — CITALOPRAM 20 MG/1
TABLET ORAL
Qty: 45 TABLET | Refills: 0 | Status: SHIPPED | OUTPATIENT
Start: 2023-10-06

## 2023-10-30 ENCOUNTER — TELEPHONE (OUTPATIENT)
Dept: FAMILY MEDICINE CLINIC | Facility: CLINIC | Age: 48
End: 2023-10-30

## 2023-10-30 NOTE — TELEPHONE ENCOUNTER
"    Caller: Manuel Bellamy \"TORITO\"    Relationship: Self    Best call back number: [phone]    What medication are you requesting: ANXIETY MEDICATION/PATIENT WILL BE FLYING SOON    What are your current symptoms:     How long have you been experiencing symptoms:     Have you had these symptoms before:    [x] Yes  [] No    Have you been treated for these symptoms before:   [x] Yes  [] No    If a prescription is needed, what is your preferred pharmacy and phone number:  [facility]  [address] [place] [address]  [phone]    Additional notes: PATIENT WILL BE FLYING ON 11/04/23 AND FLYING HOME ON 11/11/23 AND IS CALLING IN TO ASK DR NINA IF SHE CAN PRESCRIBE SOMETHING FOR HIM AS HE IS NERVOUS TO FLY.        "

## 2023-10-31 DIAGNOSIS — F40.243 FEAR OF FLYING: Primary | ICD-10-CM

## 2023-10-31 RX ORDER — ALPRAZOLAM 0.5 MG/1
0.5 TABLET ORAL 2 TIMES DAILY PRN
Qty: 10 TABLET | Refills: 0 | Status: SHIPPED | OUTPATIENT
Start: 2023-10-31

## 2023-11-14 ENCOUNTER — OFFICE VISIT (OUTPATIENT)
Dept: FAMILY MEDICINE CLINIC | Facility: CLINIC | Age: 48
End: 2023-11-14
Payer: COMMERCIAL

## 2023-11-14 VITALS
HEART RATE: 89 BPM | DIASTOLIC BLOOD PRESSURE: 84 MMHG | BODY MASS INDEX: 31.97 KG/M2 | WEIGHT: 236 LBS | TEMPERATURE: 98 F | HEIGHT: 72 IN | SYSTOLIC BLOOD PRESSURE: 126 MMHG | OXYGEN SATURATION: 97 %

## 2023-11-14 DIAGNOSIS — R68.89 FLU-LIKE SYMPTOMS: ICD-10-CM

## 2023-11-14 DIAGNOSIS — J32.1 FRONTAL SINUSITIS, UNSPECIFIED CHRONICITY: Primary | ICD-10-CM

## 2023-11-14 DIAGNOSIS — E79.0 ELEVATED URIC ACID IN BLOOD: Primary | ICD-10-CM

## 2023-11-14 LAB
EXPIRATION DATE: NORMAL
FLUAV AG UPPER RESP QL IA.RAPID: NOT DETECTED
FLUBV AG UPPER RESP QL IA.RAPID: NOT DETECTED
INTERNAL CONTROL: NORMAL
Lab: NORMAL
SARS-COV-2 AG UPPER RESP QL IA.RAPID: NOT DETECTED

## 2023-11-14 PROCEDURE — 87428 SARSCOV & INF VIR A&B AG IA: CPT | Performed by: NURSE PRACTITIONER

## 2023-11-14 PROCEDURE — 99213 OFFICE O/P EST LOW 20 MIN: CPT | Performed by: NURSE PRACTITIONER

## 2023-11-14 RX ORDER — DOXYCYCLINE HYCLATE 100 MG/1
100 CAPSULE ORAL 2 TIMES DAILY
Qty: 14 CAPSULE | Refills: 0 | Status: SHIPPED | OUTPATIENT
Start: 2023-11-14 | End: 2023-11-21

## 2023-11-14 NOTE — PROGRESS NOTES
"Chief Complaint   Patient presents with    Cough    Sore Throat    Headache    Facial Pain       Upper Respiratory Infection: Patient complains of symptoms of a URI. Symptoms include congestion, cough, sore throat, and headache . Onset of symptoms was 3 days ago, gradually worsening since that time. He also c/o achiness, congestion, nasal congestion, no  fever, purulent nasal discharge, and sore throat for the past 3 days .  He is drinking moderate amounts of fluids. Evaluation to date: none. Treatment to date:  NyQuill and Mucinex .  Ill contacts at home or school or work discussed. None.    Recently traveled to [place] and [place] for vacation. Traveled by plane.      The following portions of the patient's history were reviewed and updated as appropriate: allergies, current medications, past family history, past medical history, past social history, past surgical history and problem list.        Vitals:    11/14/23 1131   BP: 126/84   Pulse: 89   Temp: 98 °F (36.7 °C)   SpO2: 97%   Weight: 107 kg (236 lb)   Height: 182.9 cm (72.01\")     Gen: Ill appearing, alert, congested, masked  Head:  Frontal sinus tenderness  Ears: TM's bulging without redness  Nose:  Congestion  Throat:  Red without exudate, some drainage  Neck: No LAD  Lung: Good air movement, regular RR  Heart: RR without murmur  Skin: No rash    POCT SARS-CoV-2 Antigen WILEY + Flu (11/14/2023 11:55)    Assessment & Plan   Diagnoses and all orders for this visit:    1. Frontal sinusitis, unspecified chronicity (Primary)  -     Chlorcyclizine-Pseudoephed 25-60 MG tablet; Take 1 tablet by mouth Every 8 (Eight) Hours As Needed (sinus congestion).  Dispense: 30 tablet; Refill: 0  -     doxycycline (VIBRAMYCIN) 100 MG capsule; Take 1 capsule by mouth 2 (Two) Times a Day for 7 days.  Dispense: 14 capsule; Refill: 0    2. Flu-like symptoms  -     POCT SARS-CoV-2 Antigen WILEY + Flu               Tylenol or Advil as needed for pain, fever, muscle aches  Plenty of " fluids  Hand washing discussed  Off work or school note given if needed.  Warm tea for throat.  Pros and cons of antibiotic use discussed.  Instructed to notify us if symptoms worsen or do not improve.        Patient was wearing face mask when I entered the room and throughout our encounter. Protective equipment was worn throughout this patient encounter including a face mask.  Hand hygiene was performed before donning protective equipment and after removal when leaving the room.     Darya Bain, SACHIN  Family Practice  [facility] [place]

## 2023-11-16 DIAGNOSIS — E55.9 VITAMIN D DEFICIENCY: Primary | ICD-10-CM

## 2023-11-16 RX ORDER — ERGOCALCIFEROL 1.25 MG/1
50000 CAPSULE ORAL WEEKLY
Qty: 12 CAPSULE | Refills: 0 | Status: SHIPPED | OUTPATIENT
Start: 2023-11-16

## 2024-01-02 RX ORDER — FLUTICASONE PROPIONATE 50 MCG
SPRAY, SUSPENSION (ML) NASAL
Qty: 16 G | Refills: 0 | Status: SHIPPED | OUTPATIENT
Start: 2024-01-02

## 2024-01-09 DIAGNOSIS — F41.9 ANXIETY: ICD-10-CM

## 2024-01-09 RX ORDER — CITALOPRAM 20 MG/1
TABLET ORAL
Qty: 45 TABLET | Refills: 0 | Status: SHIPPED | OUTPATIENT
Start: 2024-01-09

## 2024-02-08 ENCOUNTER — TELEPHONE (OUTPATIENT)
Dept: FAMILY MEDICINE CLINIC | Facility: CLINIC | Age: 49
End: 2024-02-08

## 2024-02-08 NOTE — TELEPHONE ENCOUNTER
"  Caller: Manuel Bellamy \"TORITO\"    Relationship: Self    Best call back number: [phone]     What medication are you requesting: COUGH MEDICATION     What are your current symptoms: ACHY, HIGH FEVER, FATIGUE, SORE THROAT AND COUGH     How long have you been experiencing symptoms: 2/7/24    Have you had these symptoms before:    [] Yes  [x] No    Have you been treated for these symptoms before:   [] Yes  [x] No    If a prescription is needed, what is your preferred pharmacy and phone number:  [facility] - [place], [place] - [address] [facility] [phone] [facility] [phone]         Additional notes: PATIENTS SON WAS DIAGNOSED WITH TYPE A FLU ON TUESDAY AND HE HAS BEEN THE ONE AT HOME TAKING CARE OF HIM     PATIENT STATES THE COUGH IS NON STOP AND HE WAS UP ALL NIGHT WITH THE COUGH       "

## 2024-02-09 NOTE — TELEPHONE ENCOUNTER
Called pt to make an appointment to be seen in office for possible flu. Pt stated he was not able to come in today. Advised to go to urgent care for evaluation and treatment.

## 2024-03-19 RX ORDER — FLUTICASONE PROPIONATE 50 MCG
SPRAY, SUSPENSION (ML) NASAL
Qty: 16 G | Refills: 0 | Status: SHIPPED | OUTPATIENT
Start: 2024-03-19

## 2024-04-09 DIAGNOSIS — F41.9 ANXIETY: ICD-10-CM

## 2024-04-10 RX ORDER — CITALOPRAM 20 MG/1
TABLET ORAL
Qty: 45 TABLET | Refills: 0 | Status: SHIPPED | OUTPATIENT
Start: 2024-04-10

## 2024-05-21 ENCOUNTER — TELEPHONE (OUTPATIENT)
Dept: FAMILY MEDICINE CLINIC | Facility: CLINIC | Age: 49
End: 2024-05-21

## 2024-05-21 NOTE — TELEPHONE ENCOUNTER
"  Caller: Manuel Bellamy \"TORITO\"    Relationship: Self    Best call back number: [phone]     What is the best time to reach you: ANY     Who are you requesting to speak with (clinical staff, provider,  specific staff member): SACHIN ENNIS       What was the call regarding: PATIENT STATES HIS VITAMIN D WAS REALLY LOW AND HE WAS PRESCRIBED VITAMIN D     PATIENT WOULD LIKE TO KNOW IF HE NEEDS TO CONTINUE THE MEDICATION OR GET SOMETHING OVER THE COUNTER    Is it okay if the provider responds through MyChart: YES       "

## 2024-05-21 NOTE — TELEPHONE ENCOUNTER
PATIENT STATES HIS VITAMIN D WAS REALLY LOW AND HE WAS PRESCRIBED VITAMIN D      PATIENT WOULD LIKE TO KNOW IF HE NEEDS TO CONTINUE THE MEDICATION OR GET SOMETHING OVER THE COUNTER     Is it okay if the provider responds through MyChart: YES

## 2024-05-21 NOTE — TELEPHONE ENCOUNTER
"    Caller: Manuel Bellamy \"TORITO\"    Relationship: Self    Best call back number: [phone]     What is the medical concern/diagnosis: EAR CANAL CLEAN     What specialty or service is being requested: ENT     What is the provider, practice or medical service name: PATIENT STATES SACHIN ENNIS SENT HIM TO ONE 2 YEARS AGO AND DOES NOT REMEMBER THE NAME BUT WOULD LIKE TO GO TO THE SAME ONE       PATIENT WOULD LIKE A CALLBACK     "

## 2024-05-24 ENCOUNTER — OFFICE VISIT (OUTPATIENT)
Dept: FAMILY MEDICINE CLINIC | Facility: CLINIC | Age: 49
End: 2024-05-24
Payer: COMMERCIAL

## 2024-05-24 VITALS
DIASTOLIC BLOOD PRESSURE: 84 MMHG | HEART RATE: 78 BPM | OXYGEN SATURATION: 97 % | TEMPERATURE: 98.2 F | BODY MASS INDEX: 32.1 KG/M2 | SYSTOLIC BLOOD PRESSURE: 122 MMHG | WEIGHT: 237 LBS | HEIGHT: 72 IN

## 2024-05-24 DIAGNOSIS — E55.9 VITAMIN D DEFICIENCY: ICD-10-CM

## 2024-05-24 DIAGNOSIS — H61.23 BILATERAL IMPACTED CERUMEN: Primary | ICD-10-CM

## 2024-05-24 PROCEDURE — 99213 OFFICE O/P EST LOW 20 MIN: CPT | Performed by: NURSE PRACTITIONER

## 2024-05-24 PROCEDURE — 69209 REMOVE IMPACTED EAR WAX UNI: CPT | Performed by: NURSE PRACTITIONER

## 2024-05-24 RX ORDER — ERGOCALCIFEROL 1.25 MG/1
50000 CAPSULE ORAL WEEKLY
Qty: 12 CAPSULE | Refills: 1 | Status: SHIPPED | OUTPATIENT
Start: 2024-05-24

## 2024-05-24 NOTE — PROGRESS NOTES
Patient ID: Manuel Bellamy is a 49 y.o. male     Patient Care Team:  Head, SACHIN Alexander as PCP - General (Nurse Practitioner)    Subjective     Chief Complaint   Patient presents with    Ear Fullness     Left       History of Present Illness    Manuel Bellamy presents to [facility] today for complaints of plugged sensation to both ears, left worse than right.  Has had history of impacted earwax in the past and required irrigation.  He is actually seen an ENT for this in the past.  No home remedies.  No complaints of dizziness or other concerns.     He denies any complaints of fever, chills, cough, chest pain, shortness of air, abdominal pain, nausea, or any other concerns.     The following portions of the patient's history were reviewed and updated as appropriate: allergies, current medications, past family history, past medical history, past social history, past surgical history and problem list.       ROS    Vitals:    05/24/24 0845   BP: 122/84   Pulse: 78   Temp: 98.2 °F (36.8 °C)   SpO2: 97%       Documented weights    05/24/24 0845   Weight: 108 kg (237 lb)     Body mass index is 32.14 kg/m².    Results for orders placed or performed in visit on 11/14/23   POCT SARS-CoV-2 Antigen WILEY + Flu    Specimen: Swab   Result Value Ref Range    SARS Antigen Not Detected Not Detected, Presumptive Negative    Influenza A Antigen WILEY Not Detected Not Detected    Influenza B Antigen WILEY Not Detected Not Detected    Internal Control Passed Passed    Lot Number [id number]     Expiration Date 10/27/24            Objective     Physical Exam  Vitals reviewed.   Constitutional:       General: He is not in acute distress.  HENT:      Right Ear: There is impacted cerumen.      Left Ear: There is impacted cerumen.   Cardiovascular:      Rate and Rhythm: Normal rate.   Pulmonary:      Effort: Pulmonary effort is normal.   Neurological:      Mental Status: He is alert.       Ear Cerumen  "Removal    Date/Time: 5/24/2024 11:46 AM    Performed by: Darya Bain APRN  Authorized by: Darya Bain APRN  Consent: Verbal consent obtained.  Risks and benefits: risks, benefits and alternatives were discussed  Consent given by: patient  Patient understanding: patient states understanding of the procedure being performed  Patient identity confirmed: verbally with patient  Time out: Immediately prior to procedure a \"time out\" was called to verify the correct patient, procedure, equipment, support staff and site/side marked as required.    Anesthesia:  Local Anesthetic: none  Location details: left ear and right ear  Patient tolerance: patient tolerated the procedure well with no immediate complications  Comments: Large amount irrigated from right ear canal.  TM normal.  Moderate amount irrigated from left ear canal however some cerumen remains and unable to visualize TM.  Instructed to use Debrox drops as directed.  Return next week if no improvement.    Procedure type: irrigation   Sedation:  Patient sedated: no          Assessment & Plan     Assessment/Plan     Diagnoses and all orders for this visit:    1. Bilateral impacted cerumen (Primary)  -     Ear Cerumen Removal  -     carbamide peroxide (Debrox) 6.5 % otic solution; Administer 5 drops into the left ear 2 (Two) Times a Day.    2. Vitamin D deficiency  -     vitamin D (ERGOCALCIFEROL) 1.25 MG (03440 UT) capsule capsule; Take 1 capsule by mouth 1 (One) Time Per Week.  Dispense: 12 capsule; Refill: 1        Follow Up:  Return for Next scheduled follow up.    In the meantime, instructed to contact us sooner for any problems or concerns.    Patient was given instructions and counseling regarding condition or for health maintenance advice.  Please see specific information pulled into the AVS if appropriate.          SACHIN Saeed  Family Medicine  [facility]  05/24/24  11:47 EDT    "

## 2024-07-09 DIAGNOSIS — F41.9 ANXIETY: ICD-10-CM

## 2024-07-10 RX ORDER — CITALOPRAM 20 MG/1
TABLET ORAL
Qty: 45 TABLET | Refills: 0 | Status: SHIPPED | OUTPATIENT
Start: 2024-07-10

## 2024-09-29 DIAGNOSIS — F41.9 ANXIETY: ICD-10-CM

## 2024-09-30 RX ORDER — CITALOPRAM HYDROBROMIDE 20 MG/1
TABLET ORAL
Qty: 45 TABLET | Refills: 0 | Status: SHIPPED | OUTPATIENT
Start: 2024-09-30

## 2024-10-08 DIAGNOSIS — E79.0 ELEVATED URIC ACID IN BLOOD: ICD-10-CM

## 2024-10-08 DIAGNOSIS — R79.89 ELEVATED TSH: ICD-10-CM

## 2024-10-08 DIAGNOSIS — Z12.5 PROSTATE CANCER SCREENING: ICD-10-CM

## 2024-10-08 DIAGNOSIS — Z00.00 ANNUAL PHYSICAL EXAM: ICD-10-CM

## 2024-10-08 DIAGNOSIS — E55.9 VITAMIN D DEFICIENCY: Primary | ICD-10-CM

## 2024-10-16 DIAGNOSIS — R79.89 LOW TESTOSTERONE LEVEL IN MALE: Primary | ICD-10-CM

## 2024-10-17 LAB — TESTOST SERPL-MCNC: 218 NG/DL (ref 264–916)

## 2024-10-22 RX ORDER — LORATADINE 10 MG/1
10 TABLET ORAL DAILY
Qty: 90 TABLET | Refills: 0 | Status: SHIPPED | OUTPATIENT
Start: 2024-10-22

## 2024-10-31 ENCOUNTER — OFFICE VISIT (OUTPATIENT)
Dept: FAMILY MEDICINE CLINIC | Facility: CLINIC | Age: 49
End: 2024-10-31
Payer: COMMERCIAL

## 2024-10-31 VITALS
HEART RATE: 70 BPM | BODY MASS INDEX: 31.97 KG/M2 | TEMPERATURE: 98.4 F | OXYGEN SATURATION: 99 % | WEIGHT: 236 LBS | HEIGHT: 72 IN | SYSTOLIC BLOOD PRESSURE: 130 MMHG | DIASTOLIC BLOOD PRESSURE: 80 MMHG

## 2024-10-31 DIAGNOSIS — E55.9 VITAMIN D DEFICIENCY: ICD-10-CM

## 2024-10-31 DIAGNOSIS — Z00.00 ANNUAL PHYSICAL EXAM: Primary | ICD-10-CM

## 2024-10-31 DIAGNOSIS — Z12.5 PROSTATE CANCER SCREENING: ICD-10-CM

## 2024-10-31 DIAGNOSIS — F41.9 ANXIETY: ICD-10-CM

## 2024-10-31 DIAGNOSIS — R79.89 LOW TESTOSTERONE LEVEL IN MALE: ICD-10-CM

## 2024-10-31 DIAGNOSIS — E78.2 MIXED HYPERLIPIDEMIA: ICD-10-CM

## 2024-10-31 DIAGNOSIS — Z12.11 ENCOUNTER FOR SCREENING COLONOSCOPY: ICD-10-CM

## 2024-10-31 DIAGNOSIS — R53.83 FATIGUE, UNSPECIFIED TYPE: ICD-10-CM

## 2024-10-31 RX ORDER — ROSUVASTATIN CALCIUM 5 MG/1
5 TABLET, COATED ORAL DAILY
Qty: 90 TABLET | Refills: 3 | Status: SHIPPED | OUTPATIENT
Start: 2024-10-31

## 2024-10-31 RX ORDER — ERGOCALCIFEROL 1.25 MG/1
50000 CAPSULE, LIQUID FILLED ORAL WEEKLY
Qty: 12 CAPSULE | Refills: 3 | Status: SHIPPED | OUTPATIENT
Start: 2024-10-31

## 2024-10-31 RX ORDER — CITALOPRAM HYDROBROMIDE 20 MG/1
10 TABLET ORAL DAILY
Qty: 45 TABLET | Refills: 3 | Status: SHIPPED | OUTPATIENT
Start: 2024-10-31

## 2024-10-31 NOTE — PROGRESS NOTES
"Chief Complaint   Patient presents with    Annual Exam       Patient Care Team:  Head, SAHCIN Alexander as PCP - General (Nurse Practitioner)    Subjective   Manuel Bellamy is a 49 y.o. male and is here for a yearly physical exam. The patient reports no problems.  Except problems with fatigue.  Had testosterone level checked which was low however wanting to hold off on replacement at this time.      Do you take any herbs or supplements that were not prescribed by a doctor? no. If so, these will be added to active medication list.    Health Habits:  Dental Exam: Up to date  Eye Exam: Up to date  Diet: Tries to eat healthy.  Feels like he has done better this year.    Exercise:   Current exercise activities include: Active with work   Colonoscopy: Cologuard 7/18/2021  SCANNED EKG (11/16/2017)      The following portions of the patient's history were reviewed and updated as appropriate: allergies, current medications, past family history, past medical history, past social history, past surgical history, and problem list.    Social and Family and Surgical History reviewed and updated today, see Rooming tab.    Health History, Preventive Measures and Vaccination flow sheets reviewed and updated today.    Patient's current medical chart in Epic; including previous office notes, imaging, labs, specialist's evaluation either in notes or in Media tab reviewed today.    Other pertinent medical information also reviewed thru Care Everywhere function is also reviewed today.    Review of Systems  Review of Systems  Vitals:    10/31/24 0819   BP: 130/80   BP Location: Left arm   Patient Position: Sitting   Cuff Size: Adult   Pulse: 70   Temp: 98.4 °F (36.9 °C)   SpO2: 99%   Weight: 107 kg (236 lb)   Height: 182.9 cm (72\")     Wt Readings from Last 3 Encounters:   10/31/24 107 kg (236 lb)   05/24/24 108 kg (237 lb)   11/14/23 107 kg (236 lb)       General Appearance:  Alert, cooperative, no distress, appears stated age   Head:  " Normocephalic, without obvious abnormality, atraumatic   Eyes:  PERRL, conjunctiva/corneas clear, EOM's intact.   Ears:  Normal TM's and external ear canals, both ears   Nose: Nares normal, septum midline, mucosa normal, no drainage or sinus tenderness   Throat: Lips, mucosa, and tongue normal; teeth and gums normal   Neck: Supple, symmetrical, trachea midline, no adenopathy;   thyroid: No enlargement/tenderness/nodules       Lungs:  Clear to auscultation bilaterally, respirations even and unlabored   Chest wall:  No tenderness or deformity   Heart:  Regular rate and rhythm, S1 and S2 normal, no murmur, rub or gallop   Abdomen:  Soft, non-tender, bowel sounds active all four quadrants,   no masses, no organomegaly           Extremities: Extremities normal, atraumatic, no cyanosis or edema   Pulses: 2+ and symmetric all extremities   Skin: Skin color, texture, turgor normal, no rashes or lesions   Lymph nodes: Cervical and supraclavicular nodes normal   Neurologic: CNII-XII intact. Normal strength.       BMI is >= 30 and <35. (Class 1 Obesity). The following options were offered after discussion;: exercise counseling/recommendations, nutrition counseling/recommendations, and information on healthy weight added to patient's after visit summary        Results for orders placed or performed in visit on 10/16/24   CBC (No Diff)    Collection Time: 10/16/24  9:37 AM    Specimen: Blood   Result Value Ref Range    WBC 6.01 3.40 - 10.80 10*3/mm3    RBC 5.28 4.14 - 5.80 10*6/mm3    Hemoglobin 16.3 13.0 - 17.7 g/dL    Hematocrit 49.3 37.5 - 51.0 %    MCV 93.4 79.0 - 97.0 fL    MCH 30.9 26.6 - 33.0 pg    MCHC 33.1 31.5 - 35.7 g/dL    RDW 12.5 12.3 - 15.4 %    Platelets 271 140 - 450 10*3/mm3   Comprehensive Metabolic Panel    Collection Time: 10/16/24  9:37 AM    Specimen: Blood   Result Value Ref Range    Glucose 97 65 - 99 mg/dL    BUN 14 6 - 20 mg/dL    Creatinine 0.86 0.76 - 1.27 mg/dL    EGFR Result 105.5 >60.0 mL/min/1.73     BUN/Creatinine Ratio 16.3 7.0 - 25.0    Sodium 135 (L) 136 - 145 mmol/L    Potassium 5.0 3.5 - 5.2 mmol/L    Chloride 100 98 - 107 mmol/L    Total CO2 25.1 22.0 - 29.0 mmol/L    Calcium 9.5 8.6 - 10.5 mg/dL    Total Protein 7.4 6.0 - 8.5 g/dL    Albumin 4.3 3.5 - 5.2 g/dL    Globulin 3.1 gm/dL    A/G Ratio 1.4 g/dL    Total Bilirubin 0.4 0.0 - 1.2 mg/dL    Alkaline Phosphatase 89 39 - 117 U/L    AST (SGOT) 25 1 - 40 U/L    ALT (SGPT) 32 1 - 41 U/L   Lipid Panel With / Chol / HDL Ratio    Collection Time: 10/16/24  9:37 AM    Specimen: Blood   Result Value Ref Range    Total Cholesterol 220 (H) 0 - 200 mg/dL    Triglycerides 237 (H) 0 - 150 mg/dL    HDL Cholesterol 40 40 - 60 mg/dL    VLDL Cholesterol Russell 43 (H) 5 - 40 mg/dL    LDL Chol Calc (NIH) 137 (H) 0 - 100 mg/dL    Chol/HDL Ratio 5.50    TSH Rfx On Abnormal To Free T4    Collection Time: 10/16/24  9:37 AM    Specimen: Blood   Result Value Ref Range    TSH 3.090 0.270 - 4.200 uIU/mL   UA / M With / Rflx Culture([facility] ONLY) - Urine, Clean Catch    Collection Time: 10/16/24  9:37 AM    Specimen: Urine, Clean Catch   Result Value Ref Range    Specific Gravity, UA 1.022 1.005 - 1.030    pH, UA 7.0 5.0 - 7.5    Color, UA Yellow Yellow    Appearance, UA Clear Clear    Leukocytes, UA Negative Negative    Protein Trace Negative/Trace    Glucose, UA Negative Negative    Ketones Negative Negative    Blood, UA Negative Negative    Bilirubin, UA Negative Negative    Urobilinogen, UA 0.2 0.2 - 1.0 mg/dL    Nitrite, UA Negative Negative    Microscopic Examination Comment     Microscopic Examination See below:     Urinalysis Reflex Comment    Uric Acid    Collection Time: 10/16/24  9:37 AM    Specimen: Blood   Result Value Ref Range    Uric Acid 5.9 3.4 - 7.0 mg/dL   PSA Screen    Collection Time: 10/16/24  9:37 AM    Specimen: Blood   Result Value Ref Range    PSA 0.982 0.000 - 4.000 ng/mL   Vitamin D,25-Hydroxy    Collection Time: 10/16/24  9:37 AM    Specimen: Blood    Result Value Ref Range    25 Hydroxy, Vitamin D 37.2 30.0 - 100.0 ng/ml   Microscopic Examination -    Collection Time: 10/16/24  9:37 AM   Result Value Ref Range    WBC, UA None seen 0 - 5 /hpf    RBC, UA None seen 0 - 2 /hpf    Epithelial Cells (non renal) None seen 0 - 10 /hpf    Casts None seen None seen /lpf    Bacteria, UA None seen None seen/Few       The 10-year ASCVD risk score (Glen DK, et al., 2019) is: 5.1%    Values used to calculate the score:      Age: 49 years      Sex: Male      Is Non- : No      Diabetic: No      Tobacco smoker: No      Systolic Blood Pressure: 130 mmHg      Is BP treated: No      HDL Cholesterol: 40 mg/dL      Total Cholesterol: 220 mg/dL    Assessment & Plan   Healthy male exam.  Diagnoses and all orders for this visit:    1. Annual physical exam (Primary)  -     CBC (No Diff); Future  -     Comprehensive Metabolic Panel; Future  -     Lipid Panel With / Chol / HDL Ratio; Future  -     TSH Rfx On Abnormal To Free T4; Future    2. Mixed hyperlipidemia  -     rosuvastatin (Crestor) 5 MG tablet; Take 1 tablet by mouth Daily.  Dispense: 90 tablet; Refill: 3  -     Comprehensive Metabolic Panel; Future  -     Lipid Panel With / Chol / HDL Ratio; Future    3. Vitamin D deficiency  -     vitamin D (ERGOCALCIFEROL) 1.25 MG (43984 UT) capsule capsule; Take 1 capsule by mouth 1 (One) Time Per Week.  Dispense: 12 capsule; Refill: 3  -     Vitamin D,25-Hydroxy; Future    4. Encounter for screening colonoscopy  -     Ambulatory Referral For Screening Colonoscopy    5. Anxiety  -     citalopram (CeleXA) 20 MG tablet; Take 0.5 tablets by mouth Daily.  Dispense: 45 tablet; Refill: 3    6. Prostate cancer screening  -     PSA Screen; Future    7. Low testosterone level in male  -     Testosterone; Future    8. Fatigue, unspecified type  -     CBC (No Diff); Future  -     Comprehensive Metabolic Panel; Future  -     Testosterone; Future  -     Vitamin D,25-Hydroxy;  Future    9. Anxiety  Comments:  - stable  - RF SSRI  Orders:  -     citalopram (CeleXA) 20 MG tablet; Take 0.5 tablets by mouth Daily.  Dispense: 45 tablet; Refill: 3      1. Manuel Bellamy has been doing well since he was last seen except for ongoing problems with fatigue.  Reviewed recent labs along with patient which all remained stable except for low testosterone of 218.  Advised we will need to repeat test to confirm however he wishes to hold off on replacement at this time.  Will continue to monitor.  Cholesterol levels remain slightly elevated with total cholesterol of 220.  LDL of 137.  He has only been taking the atorvastatin approximately 2 days a week due to GI upset and muscle pain.  Advised to try switching to Crestor 5 mg daily to see if symptoms improve.  He is to let us know if side effects continue.  Continue to watch diet limit cholesterol intake.  Kidney, liver, thyroid function test are normal.  Blood pressure stable at 130/80.  No other changes in medications at this time.  2. Patient Counseling:  --Nutrition: Stressed importance of moderation in sodium/caffeine intake, saturated fat and cholesterol.  Discussed caloric balance, sufficient intake of fresh fruits, vegetables, fiber,    calcium, iron.  --Exercise: Stressed the importance of regular exercise.   --Substance Abuse: Discussed cessation/primary prevention of tobacco, alcohol, or other drug use; driving or other dangerous activities under the influence.    --Dental health: Discussed importance of regular tooth brushing, flossing, and dental visits.  --Suggested having eyes and vision checked if needed or past due.  --Immunizations reviewed.  --Discussed benefits of screening colonoscopy.  Placed referral for screening colonoscopy.  3. Discussed the patient's BMI with him.  The BMI is above average; BMI management plan is completed  4. Follow up next physical in 1 year    Patient was given instructions and counseling regarding  condition or for health maintenance advice.  Please see specific information pulled into the AVS if appropriate.      Medications Discontinued During This Encounter   Medication Reason    vitamin D (ERGOCALCIFEROL) 1.25 MG (11458 UT) capsule capsule Reorder    NON FORMULARY *Therapy completed    Chlorcyclizine-Pseudoephed 25-60 MG tablet *Therapy completed    carbamide peroxide (Debrox) 6.5 % otic solution *Therapy completed    ALPRAZolam (Xanax) 0.5 MG tablet *Therapy completed    citalopram (CeleXA) 20 MG tablet Reorder          Darya Bain, APRN  Family Practice  [facility] [place]

## 2024-11-21 ENCOUNTER — PREP FOR SURGERY (OUTPATIENT)
Dept: OTHER | Facility: HOSPITAL | Age: 49
End: 2024-11-21
Payer: COMMERCIAL

## 2024-11-21 DIAGNOSIS — Z12.11 ENCOUNTER FOR SCREENING COLONOSCOPY: Primary | ICD-10-CM

## 2025-01-27 ENCOUNTER — TELEPHONE (OUTPATIENT)
Dept: SURGERY | Facility: CLINIC | Age: 50
End: 2025-01-27

## 2025-01-27 NOTE — TELEPHONE ENCOUNTER
"  Caller: Manuel Bellamy \"TORITO\"    Relationship: Self    Best call back number: [phone]     What is the best time to reach you: ANYTIME    Who are you requesting to speak with (clinical staff, provider,  specific staff member): SURGERY SCHEDULERS    Do you know the name of the person who called:     What was the call regarding: PT WANTING TO SCHEDULE COLONOSCOPY - NEEDS TO CHECK SPOUSES SCHEDULE, SO RETURN CALL AFTER TODAY IS FINE    Is it okay if the provider responds through [employer]: YES, OKAY TO LEAVE DETAILED VM      "

## 2025-02-07 ENCOUNTER — TELEPHONE (OUTPATIENT)
Dept: FAMILY MEDICINE CLINIC | Facility: CLINIC | Age: 50
End: 2025-02-07

## 2025-02-07 DIAGNOSIS — Z20.828 EXPOSURE TO THE FLU: Primary | ICD-10-CM

## 2025-02-07 RX ORDER — OSELTAMIVIR PHOSPHATE 75 MG/1
75 CAPSULE ORAL DAILY
Qty: 7 CAPSULE | Refills: 0 | Status: SHIPPED | OUTPATIENT
Start: 2025-02-07 | End: 2025-02-14

## 2025-02-07 NOTE — TELEPHONE ENCOUNTER
"  Caller: Josesito Manuel MOLINA \"TORITO\"    Relationship: Self    Best call back number: [phone]*    What medication are you requesting: TAMIFLU    What are your current symptoms: EXPOSED TO SON THAT WAS DIAGNOSED WITH FLU ON 2/1/25. THE PATIENT STATES THAT THIS MORNING HE JUST DOESN'T FEEL LIKE HIMSELF, BUT NOT HAVING ANY FLU SYMPTOMS CURRENTLY.    If a prescription is needed, what is your preferred pharmacy and phone number: [facility] [address] [place] [address] [address] [phone] [facility] [phone] FX     Additional notes: PATIENT REQUESTING PRESCRIPTION FOR TAMIFLU DUE TO BEING EXPOSED TO FLU.        "

## 2025-02-14 ENCOUNTER — ANESTHESIA (OUTPATIENT)
Dept: GASTROENTEROLOGY | Facility: HOSPITAL | Age: 50
End: 2025-02-14
Payer: COMMERCIAL

## 2025-02-14 ENCOUNTER — ANESTHESIA EVENT (OUTPATIENT)
Dept: GASTROENTEROLOGY | Facility: HOSPITAL | Age: 50
End: 2025-02-14
Payer: COMMERCIAL

## 2025-02-14 ENCOUNTER — HOSPITAL ENCOUNTER (OUTPATIENT)
Facility: HOSPITAL | Age: 50
Setting detail: HOSPITAL OUTPATIENT SURGERY
Discharge: HOME | End: 2025-02-14
Attending: SURGERY | Admitting: SURGERY
Payer: COMMERCIAL

## 2025-02-14 VITALS
SYSTOLIC BLOOD PRESSURE: 117 MMHG | DIASTOLIC BLOOD PRESSURE: 81 MMHG | HEART RATE: 80 BPM | WEIGHT: 234 LBS | RESPIRATION RATE: 16 BRPM | HEIGHT: 72 IN | BODY MASS INDEX: 31.69 KG/M2 | OXYGEN SATURATION: 97 %

## 2025-02-14 DIAGNOSIS — Z12.11 ENCOUNTER FOR SCREENING COLONOSCOPY: ICD-10-CM

## 2025-02-14 PROCEDURE — 25010000002 PROPOFOL 1000 MG/100ML EMULSION: Performed by: NURSE ANESTHETIST, CERTIFIED REGISTERED

## 2025-02-14 PROCEDURE — 25010000002 LIDOCAINE 2% SOLUTION: Performed by: NURSE ANESTHETIST, CERTIFIED REGISTERED

## 2025-02-14 PROCEDURE — 25810000003 LACTATED RINGERS PER 1000 ML: Performed by: SURGERY

## 2025-02-14 PROCEDURE — S0260 H&P FOR SURGERY: HCPCS | Performed by: SURGERY

## 2025-02-14 PROCEDURE — 25010000002 PROPOFOL 200 MG/20ML EMULSION: Performed by: NURSE ANESTHETIST, CERTIFIED REGISTERED

## 2025-02-14 PROCEDURE — 88305 TISSUE EXAM BY PATHOLOGIST: CPT | Performed by: SURGERY

## 2025-02-14 PROCEDURE — 25810000003 LACTATED RINGERS PER 1000 ML: Performed by: NURSE ANESTHETIST, CERTIFIED REGISTERED

## 2025-02-14 PROCEDURE — 45385 COLONOSCOPY W/LESION REMOVAL: CPT | Performed by: SURGERY

## 2025-02-14 RX ORDER — PROPOFOL 10 MG/ML
INJECTION, EMULSION INTRAVENOUS CONTINUOUS PRN
Status: DISCONTINUED | OUTPATIENT
Start: 2025-02-14 | End: 2025-02-14 | Stop reason: SURG

## 2025-02-14 RX ORDER — LIDOCAINE HYDROCHLORIDE 20 MG/ML
INJECTION, SOLUTION INFILTRATION; PERINEURAL AS NEEDED
Status: DISCONTINUED | OUTPATIENT
Start: 2025-02-14 | End: 2025-02-14 | Stop reason: SURG

## 2025-02-14 RX ORDER — SODIUM CHLORIDE, SODIUM LACTATE, POTASSIUM CHLORIDE, CALCIUM CHLORIDE 600; 310; 30; 20 MG/100ML; MG/100ML; MG/100ML; MG/100ML
INJECTION, SOLUTION INTRAVENOUS CONTINUOUS PRN
Status: DISCONTINUED | OUTPATIENT
Start: 2025-02-14 | End: 2025-02-14 | Stop reason: SURG

## 2025-02-14 RX ORDER — SODIUM CHLORIDE, SODIUM LACTATE, POTASSIUM CHLORIDE, CALCIUM CHLORIDE 600; 310; 30; 20 MG/100ML; MG/100ML; MG/100ML; MG/100ML
INJECTION, SOLUTION INTRAVENOUS CONTINUOUS PRN
Status: DISCONTINUED | OUTPATIENT
Start: 2025-02-14 | End: 2025-02-14

## 2025-02-14 RX ORDER — SODIUM CHLORIDE 9 MG/ML
40 INJECTION, SOLUTION INTRAVENOUS AS NEEDED
Status: DISCONTINUED | OUTPATIENT
Start: 2025-02-14 | End: 2025-02-14 | Stop reason: HOSPADM

## 2025-02-14 RX ORDER — SODIUM CHLORIDE 0.9 % (FLUSH) 0.9 %
10 SYRINGE (ML) INJECTION AS NEEDED
Status: DISCONTINUED | OUTPATIENT
Start: 2025-02-14 | End: 2025-02-14 | Stop reason: HOSPADM

## 2025-02-14 RX ORDER — PROPOFOL 10 MG/ML
INJECTION, EMULSION INTRAVENOUS AS NEEDED
Status: DISCONTINUED | OUTPATIENT
Start: 2025-02-14 | End: 2025-02-14 | Stop reason: SURG

## 2025-02-14 RX ORDER — SODIUM CHLORIDE 0.9 % (FLUSH) 0.9 %
10 SYRINGE (ML) INJECTION EVERY 12 HOURS SCHEDULED
Status: DISCONTINUED | OUTPATIENT
Start: 2025-02-14 | End: 2025-02-14 | Stop reason: HOSPADM

## 2025-02-14 RX ORDER — SODIUM CHLORIDE, SODIUM LACTATE, POTASSIUM CHLORIDE, CALCIUM CHLORIDE 600; 310; 30; 20 MG/100ML; MG/100ML; MG/100ML; MG/100ML
30 INJECTION, SOLUTION INTRAVENOUS CONTINUOUS PRN
Status: DISCONTINUED | OUTPATIENT
Start: 2025-02-14 | End: 2025-02-14 | Stop reason: HOSPADM

## 2025-02-14 RX ADMIN — SODIUM CHLORIDE, POTASSIUM CHLORIDE, SODIUM LACTATE AND CALCIUM CHLORIDE 30 ML/HR: 600; 310; 30; 20 INJECTION, SOLUTION INTRAVENOUS at 07:41

## 2025-02-14 RX ADMIN — LIDOCAINE HYDROCHLORIDE 60 MG: 20 INJECTION, SOLUTION INFILTRATION; PERINEURAL at 08:14

## 2025-02-14 RX ADMIN — SODIUM CHLORIDE, POTASSIUM CHLORIDE, SODIUM LACTATE AND CALCIUM CHLORIDE: 600; 310; 30; 20 INJECTION, SOLUTION INTRAVENOUS at 08:11

## 2025-02-14 RX ADMIN — PROPOFOL INJECTABLE EMULSION 150 MG: 10 INJECTION, EMULSION INTRAVENOUS at 08:14

## 2025-02-14 RX ADMIN — PROPOFOL 250 MCG/KG/MIN: 10 INJECTION, EMULSION INTRAVENOUS at 08:14

## 2025-02-14 NOTE — ANESTHESIA PREPROCEDURE EVALUATION
Anesthesia Evaluation     Patient summary reviewed and Nursing notes reviewed   no history of anesthetic complications:   NPO Solid Status: > 8 hours  NPO Liquid Status: > 2 hours           Airway   Dental      Pulmonary    (+) asthma,  Cardiovascular         Neuro/Psych  (+) psychiatric history Anxiety  GI/Hepatic/Renal/Endo    (+) obesity, GERD    Musculoskeletal     Abdominal   (+) obese   Substance History      OB/GYN          Other                          Anesthesia Plan    ASA 3     MAC     intravenous induction     Anesthetic plan, risks, benefits, and alternatives have been provided, discussed and informed consent has been obtained with: patient.        CODE STATUS:

## 2025-02-14 NOTE — OP NOTE
Surgeon:     Frank Arroyo Jr., M.D.    Preoperative Diagnosis:     1.  Need for screening colonoscopy    Postoperative Diagnosis:     1.  Diverticulosis  2.  Colon polyp x 3    Procedure Performed:     1.  Colonoscopy with hot snare polypectomy x 3    Indications:     The patient is a pleasant 50-year-old male who presents for screening colonoscopy.  The patient understands the indications, alternatives, risks, and benefits of the procedure and wishes to proceed.    Procedure:     The patient was identified, taken to the endoscopy suite, and place in the left side down decubitus position.  The patient underwent a MAC anesthesia and was appropriately monitored through the case by the anesthesia personnel.  A rectal exam was performed that was normal.  The colonoscope was introduced into the rectum and advanced very carefully to the cecum that was identified by the cecal strap, ileocecal valve, and the appendiceal orifice.  The scope was then slowly withdrawn with careful circumferential examination of the mucosa performed.  The bowel prep was good allowing adequate visualization of mucosal surfaces.  The scope was withdrawn.  The patient tolerated the procedure well and was transferred the recovery area in stable condition.     Findings:    There was diverticulosis with no inflammatory changes.    There was a 6 mm sessile polyp in the transverse colon and 2 separate 6 mm sessile polyps in the sigmoid colon that were all removed by hot snare polypectomy and retrieved.    Recommendations:     1.  High-fiber diet.  2.  Await pathology results from the polypectomies.  3.  Repeat colonoscopy in 5 years.    Frank Arroyo Jr., M.D.

## 2025-02-14 NOTE — H&P
[facility]   HISTORY AND PHYSICAL    Patient Name: Manuel Bellamy  : 1975  MRN: [id number]  Primary Care Physician:  Head, SACHIN Alexander  Date of admission: 2025    Subjective   Subjective     Chief Complaint: Need for screening colonoscopy    History of Present Illness    The patient is a pleasant 50-year-old male who presents for screening colonoscopy.  He has never had a previous colonoscopy.  He has no significant GI symptoms.  He has no family history of colon cancer.    Review of Systems   Constitutional:  Negative for fatigue and fever.   Respiratory:  Negative for chest tightness and shortness of breath.    Cardiovascular:  Negative for chest pain and palpitations.   Gastrointestinal:  Negative for abdominal pain, blood in stool, constipation, diarrhea, nausea and vomiting.        Personal History     Past Medical History:   Diagnosis Date    Anxiety 10/12/2017    Asthma Allergy asthma in fall    Chronic seasonal allergic rhinitis 10/12/2017    Depression 10/12/2017    GERD (gastroesophageal reflux disease)     History of COVID-19     Mixed hyperlipidemia 2017    Plantar fasciitis     Psychophysiological insomnia 10/12/2017    Vitamin D deficiency 10/12/2017       Past Surgical History:   Procedure Laterality Date    APPENDECTOMY      CHOLECYSTECTOMY      ENDOSCOPY      TONSILLECTOMY         Family History: family history includes Endometrial cancer in his mother; Hypertension in his mother; Lung cancer in his maternal grandfather. Otherwise pertinent FHx was reviewed and not pertinent to current issue.    Social History:  reports that he has never smoked. He has never used smokeless tobacco. He reports current alcohol use of about 2.0 standard drinks of alcohol per week. He reports that he does not use drugs.    Home Medications:  citalopram, fluticasone, loratadine, rosuvastatin, and vitamin D    Allergies:  Allergies   Allergen Reactions    Tessalon [Benzonatate] Anxiety        Objective    Objective     Vitals:   Heart Rate:  [86] 86  Resp:  [16] 16  BP: (136)/(94) 136/94    Physical Exam  Constitutional:       Appearance: Normal appearance. He is well-developed. He is not toxic-appearing.   Eyes:      General: No scleral icterus.  Pulmonary:      Effort: Pulmonary effort is normal. No respiratory distress.   Abdominal:      Palpations: Abdomen is soft.      Tenderness: There is no abdominal tenderness.   Skin:     General: Skin is warm and dry.   Neurological:      Mental Status: He is alert and oriented to person, place, and time.   Psychiatric:         Behavior: Behavior normal.         Thought Content: Thought content normal.         Judgment: Judgment normal.           Assessment & Plan   Assessment / Plan     Brief Patient Summary:  Manuel Bellamy is a 50 y.o. male who is in need of a screening colonoscopy.    Active Hospital Problems:  Active Hospital Problems    Diagnosis     **Encounter for screening colonoscopy      Plan: Colonoscopy.  The patient understands the indications, alternatives, risks, and benefits of the procedure and wishes to proceed.       Frank Arroyo Jr., MD

## 2025-02-14 NOTE — DISCHARGE INSTRUCTIONS
For the next 24 hours patient needs to be with a responsible adult.    For 24 hours DO NOT drive, operate machinery, appliances, drink alcohol, make important decisions or sign legal documents.    Start with a light or bland diet if you are feeling sick to your stomach otherwise advance to regular diet as tolerated.    Follow recommendations on procedure report if provided by your doctor.    Call Dr Arroyo for problems [phone]    Problems may include but not limited to: large amounts of bleeding, trouble breathing, repeated vomiting, severe unrelieved pain, fever or chills.

## 2025-02-14 NOTE — ANESTHESIA POSTPROCEDURE EVALUATION
Patient: Manuel Bellamy    Procedure Summary       Date: 02/14/25 Room / Location:  [facility]    Anesthesia Start: 0811 Anesthesia Stop: 0839    Procedure: COLONOSCOPY into cecum with hot snare polypectomies Diagnosis:       Encounter for screening colonoscopy      (Encounter for screening colonoscopy [Z12.11])    Surgeons: Frank Arroyo Jr., MD Provider: Bon Foster MD    Anesthesia Type: MAC ASA Status: 3            Anesthesia Type: MAC    Vitals  Vitals Value Taken Time   /76 02/14/25 0915   Temp     Pulse 95 02/14/25 0919   Resp 16 02/14/25 0848   SpO2 95 % 02/14/25 0919   Vitals shown include unfiled device data.        Anesthesia Post Evaluation

## 2025-02-17 LAB
CYTO UR: NORMAL
LAB AP CASE REPORT: NORMAL
PATH REPORT.FINAL DX SPEC: NORMAL
PATH REPORT.GROSS SPEC: NORMAL

## 2025-03-04 ENCOUNTER — TELEPHONE (OUTPATIENT)
Dept: SURGERY | Facility: CLINIC | Age: 50
End: 2025-03-04
Payer: COMMERCIAL

## 2025-03-04 NOTE — TELEPHONE ENCOUNTER
----- Message from Frank Cruz sent at 2/28/2025 10:24 PM EST -----  Please contact this patient to inform that the polyps are benign and a repeat colonoscopy is needed in 5 years.  Please place in recall for a colonoscopy in 5 years.  Thanks.

## 2025-03-04 NOTE — TELEPHONE ENCOUNTER
Spoke with pt and relayed the message Dr. Arroyo sent. Pt voiced understanding. I will put that 5 year recall in now.

## 2025-04-04 ENCOUNTER — OFFICE VISIT (OUTPATIENT)
Dept: FAMILY MEDICINE CLINIC | Facility: CLINIC | Age: 50
End: 2025-04-04
Payer: COMMERCIAL

## 2025-04-04 VITALS
BODY MASS INDEX: 32.8 KG/M2 | SYSTOLIC BLOOD PRESSURE: 130 MMHG | TEMPERATURE: 97.1 F | HEIGHT: 72 IN | DIASTOLIC BLOOD PRESSURE: 90 MMHG | WEIGHT: 242.2 LBS | HEART RATE: 97 BPM | OXYGEN SATURATION: 97 %

## 2025-04-04 DIAGNOSIS — L03.012 CELLULITIS OF FINGER OF LEFT HAND: ICD-10-CM

## 2025-04-04 DIAGNOSIS — S61.239D PUNCTURE WOUND OF FINGER OF LEFT HAND, SUBSEQUENT ENCOUNTER: ICD-10-CM

## 2025-04-04 DIAGNOSIS — S61.239D PUNCTURE WOUND OF FINGER OF LEFT HAND, SUBSEQUENT ENCOUNTER: Primary | ICD-10-CM

## 2025-04-04 NOTE — PROGRESS NOTES
"  Patient or patient representative verbalized consent for the use of Ambient Listening during the visit with  SACHIN Gordillo for chart documentation. 4/4/2025  15:00 EDT    Chief Complaint   Patient presents with    Puncture Wound       Subjective      Patient ID: Manuel CORONEL" is a 50 y.o. male.     Chief Complaint   Patient presents with    Puncture Wound        Puncture Wound       History of Present Illness  Manuel Bellamy presents for for follow-up today from urgent care after sustaining an injury while attempting to create space in an old pole barn.  A deer school fell and struck him in the left hand and one of the antlers caused a puncture wound.  He has been prescribed cephalexin 500 mg 3 times daily.    He sustained the injury on Wednesday night while attempting to create space in his garage for his daughter's car. The wound was caused by a deer antler, which penetrated deeply into his hand. He reports significant pain and soreness in the affected area. He has been managing the pain with over-the-counter analgesics such as ibuprofen and Tylenol. He has been placed on light duty at work due to the injury. He is currently on cephalexin, having taken his fifth dose at lunch today. Despite the medication, he reports no noticeable improvement in the appearance of the wound as he has kept it covered. He also reports no signs of infection such as pus drainage, fevers, body aches, or chills. He does not experience any upper respiratory symptoms. He recalls that the initial bandage was soaked with blood the day after the injury.    SOCIAL HISTORY  He works as a distribution treatment .    MEDICATIONS  cephalexin       The following portions of the patient's history were reviewed and updated as appropriate: allergies, current medications, past family history, past medical history, past social history, past surgical history, and problem list.      Current Outpatient Medications:     cephalexin " "(KEFLEX) 500 MG capsule, Take 1 capsule by mouth 3 (Three) Times a Day for 7 days., Disp: 21 capsule, Rfl: 0    citalopram (CeleXA) 20 MG tablet, Take 0.5 tablets by mouth Daily. (Patient taking differently: Take 0.5 tablets by mouth Every Night.), Disp: 45 tablet, Rfl: 3    EQ All Day Allergy Relief 10 MG tablet, Take 1 tablet by mouth once daily, Disp: 90 tablet, Rfl: 0    fluticasone (FLONASE) 50 MCG/ACT nasal spray, Use 2 spray(s) in each nostril once daily (Patient taking differently: Administer 1 spray into the nostril(s) as directed by provider Daily.), Disp: 16 g, Rfl: 0    rosuvastatin (Crestor) 5 MG tablet, Take 1 tablet by mouth Daily. (Patient taking differently: Take 1 tablet by mouth Every Night.), Disp: 90 tablet, Rfl: 3    vitamin D (ERGOCALCIFEROL) 1.25 MG (64529 UT) capsule capsule, Take 1 capsule by mouth 1 (One) Time Per Week., Disp: 12 capsule, Rfl: 3        Results          Objective      /90   Pulse 97   Temp 97.1 °F (36.2 °C) (Infrared)   Ht 182.9 cm (72\")   Wt 110 kg (242 lb 3.2 oz)   SpO2 97%   BMI 32.85 kg/m²      Body mass index is 32.85 kg/m².           Physical Exam  Constitutional:       Appearance: Normal appearance. He is not ill-appearing.   Eyes:      Pupils: Pupils are equal, round, and reactive to light.   Cardiovascular:      Rate and Rhythm: Normal rate and regular rhythm.      Pulses: Normal pulses.      Heart sounds: Normal heart sounds. No murmur heard.     No friction rub.   Pulmonary:      Effort: Pulmonary effort is normal. No respiratory distress.      Breath sounds: Normal breath sounds. No wheezing or rales.   Musculoskeletal:        Arms:       Cervical back: Neck supple.      Comments: 0.5 to 1 cm semicircular puncture wound to the lateral palm inferior to the thumb of the left hand with serosanguineous discharge noted and edema surrounding the wound.  The area appears to be warm to the touch, but no significant erythema, or purulent drainage is noted.  " Distal pulses 2+.   Neurological:      General: No focal deficit present.      Mental Status: He is alert.   Psychiatric:         Mood and Affect: Mood normal.         Behavior: Behavior normal.          Physical Exam            Assessment & Plan      Assessment & Plan       1. Puncture wound of finger of left hand, subsequent encounter  2. Cellulitis of finger of left hand  he wound is not sealing over well and continues to ooze. He is currently on cephalexin (Keflex) and has taken five doses so far. A swab will be taken from the wound to identify any potential bacterial growth. The current antibiotic regimen will be maintained until the swab results are available. If the swab results indicate a bacteria that is not effectively treated by cephalexin, the antibiotic will be changed accordingly. He is advised to apply ice to the wound to alleviate swelling and pain. Over-the-counter pain relievers such as ibuprofen or Tylenol can be used as needed. The wound should be kept clean and dry, and exposed to air when possible. A mild antimicrobial soap like Dial is recommended for cleaning. A work note for light duty for the next 2 weeks has been provided. He will be contacted next week with the swab results.  - Anaerobic & Aerobic Culture ([facility] Only) - Swab, Hand, Left; Future       Return if symptoms worsen or fail to improve.       SACHIN Gordillo           Note to patient: The 21st Century Cures Act makes medical notes like these available to patients in the interest of transparency. However, be advised this is a medical document. It is intended as peer to peer communication. It is written in medical language and may contain abbreviations or verbiage that are unfamiliar. It may appear blunt or direct. Medical documents are intended to carry relevant information, facts as evident, and the clinical opinion of the practitioner.

## 2025-04-04 NOTE — LETTER
April 4, 2025     Patient: Manuel Bellamy   YOB: 1975   Date of Visit: 4/4/2025       To Whom It May Concern:    It is my medical opinion that Manuel Bellamy needs to remain on light duty at work for the next 2 weeks.         Sincerely,        SACHIN Gordillo    CC: No Recipients

## 2025-04-08 LAB
BACTERIA SPEC AEROBE CULT: NORMAL
BACTERIA SPEC ANAEROBE CULT: NORMAL
BACTERIA SPEC CULT: NORMAL
BACTERIA SPEC CULT: NORMAL

## 2025-04-18 ENCOUNTER — TELEPHONE (OUTPATIENT)
Dept: FAMILY MEDICINE CLINIC | Facility: CLINIC | Age: 50
End: 2025-04-18

## 2025-04-18 NOTE — TELEPHONE ENCOUNTER
He put the fax number it needs to go to whenever she completes it in the message. Just charlesi thanks!

## 2025-04-18 NOTE — TELEPHONE ENCOUNTER
"  Caller: Manuel Bellamy \"TORITO\"    Relationship: Self    Best call back number: [phone]     What was the call regarding: CELESTE DROPPED OFF PAPERWORK AND FORGOT TO GIVE THE FAX NUMBER TO SEND TO    FAX NUMBER [phone]      "

## 2025-07-22 ENCOUNTER — OFFICE VISIT (OUTPATIENT)
Dept: SURGERY | Facility: CLINIC | Age: 50
End: 2025-07-22
Payer: COMMERCIAL

## 2025-07-22 VITALS
DIASTOLIC BLOOD PRESSURE: 84 MMHG | OXYGEN SATURATION: 99 % | HEIGHT: 72 IN | WEIGHT: 237 LBS | SYSTOLIC BLOOD PRESSURE: 130 MMHG | HEART RATE: 94 BPM | BODY MASS INDEX: 32.1 KG/M2

## 2025-07-22 DIAGNOSIS — K40.90 LEFT INGUINAL HERNIA: Primary | ICD-10-CM

## 2025-07-22 PROCEDURE — 99213 OFFICE O/P EST LOW 20 MIN: CPT | Performed by: SURGERY

## 2025-07-22 NOTE — LETTER
July 22, 2025     Darya Bain APRN     Patient: Manuel Bellamy   YOB: 1975   Date of Visit: 7/22/2025     Dear SACHIN Mendoza:       Thank you for referring Manuel Bellamy to me for evaluation. Below are the relevant portions of my assessment and plan of care.    If you have questions, please do not hesitate to call me. I look forward to following Manuel along with you.         Sincerely,        Frank Arroyo Jr., MD        CC: No Recipients    Frank Arroyo Jr., MD  07/22/25 1103  Sign when Signing Visit  Subjective  Manuel Bellamy is a 50 y.o. male who returns to the office from Darya Bain APRN for a left inguinal hernia.    History of present illness  The patient has a known history of inguinal hernias and was seen in our office on 8/30/2022 after a CT scan of the abdomen and pelvis showed bilateral inguinal hernias that contained fat.  At that point he was asymptomatic and elected not to proceed with surgery.  He has not had any problems until recently when he went to [place] and was hiking and developed pain in the left groin.  As she continued to have activity, the pain became increasingly severe and he described it as burning.  He noticed the bulge and would reduce the bulge with some improvement in his symptoms.  At times, reducing the bulge created a gurgling sound.  He has not had any change in his bowel or bladder function.    Review of Systems   Constitutional:  Negative for chills and fever.   Gastrointestinal:  Negative for abdominal distention, abdominal pain, constipation, diarrhea, nausea and vomiting.     Past Medical History:   Diagnosis Date   • Allergic    • Anxiety 10/12/2017   • Asthma Allergy asthma in fall   • Chronic seasonal allergic rhinitis 10/12/2017   • Depression 10/12/2017   • GERD (gastroesophageal reflux disease)    • History of COVID-19    • Mixed hyperlipidemia 11/16/2017   • Plantar fasciitis    • Psychophysiological insomnia 10/12/2017    • Vitamin D deficiency 10/12/2017     Past Surgical History:   Procedure Laterality Date   • APPENDECTOMY     • CHOLECYSTECTOMY     • COLONOSCOPY N/A 2/14/2025    Procedure: COLONOSCOPY into cecum with hot snare polypectomies;  Surgeon: Frank Arroyo Jr., MD;  [facility]: [facility] ENDOSCOPY;  Service: General;  Laterality: N/A;  Screening  Post: Polyps and Diverticulosis   • ENDOSCOPY     • TONSILLECTOMY       Family History   Problem Relation Age of Onset   • Hypertension Mother    • Endometrial cancer Mother    • Lung cancer Maternal Grandfather    • Malig Hyperthermia Neg Hx      Social History     Socioeconomic History   • Marital status:    Tobacco Use   • Smoking status: Never     Passive exposure: Never   • Smokeless tobacco: Never   Vaping Use   • Vaping status: Never Used   Substance and Sexual Activity   • Alcohol use: Yes     Alcohol/week: 2.0 standard drinks of alcohol     Types: 2 Drinks containing 0.5 oz of alcohol per week     Comment: OCCASIONAL   • Drug use: Never   • Sexual activity: Yes     Partners: Female     Birth control/protection: Condom       Objective  Physical Exam  Constitutional:       Appearance: He is not ill-appearing or toxic-appearing.   Abdominal:      Palpations: Abdomen is soft.      Tenderness: There is no abdominal tenderness.      Hernia: A hernia is present. Hernia is present in the left inguinal area. There is no hernia in the right inguinal area.      Comments: There was no palpable right inguinal hernia.  There was a moderately sized left inguinal hernia that is reducible.   Neurological:      Mental Status: He is alert.   Psychiatric:         Behavior: Behavior is cooperative.              Assessment & Plan    1.  Left inguinal hernia: The patient has a symptomatic left inguinal hernia that will intermittently have gurgling when reduced suggesting the presence of bowel.  This was discussed with him.  He had a previous CT scan of the abdomen and pelvis that showed  bilateral fat-containing hernias which raises the possibility of a right inguinal hernia.  The finding on CT scan of the abdomen and pelvis may reflect a hernia or a cord lipoma.  Approaches to inguinal hernia repair were discussed with him.  He will be scheduled for da Ramiro robot assisted laparoscopic left inguinal hernia repair, possible bilateral inguinal hernia repairs.  The right groin will be carefully inspected intraoperatively to determine if it needs to be repaired.  The patient understands the indications, alternatives, risks, and benefits of the procedure and wishes to proceed.

## 2025-07-22 NOTE — PROGRESS NOTES
Subjective   Manuel Bellamy is a 50 y.o. male who returns to the office from Darya Bain APRN for a left inguinal hernia.    History of present illness  The patient has a known history of inguinal hernias and was seen in our office on 8/30/2022 after a CT scan of the abdomen and pelvis showed bilateral inguinal hernias that contained fat.  At that point he was asymptomatic and elected not to proceed with surgery.  He has not had any problems until recently when he went to [place] and was hiking and developed pain in the left groin.  As she continued to have activity, the pain became increasingly severe and he described it as burning.  He noticed the bulge and would reduce the bulge with some improvement in his symptoms.  At times, reducing the bulge created a gurgling sound.  He has not had any change in his bowel or bladder function.    Review of Systems   Constitutional:  Negative for chills and fever.   Gastrointestinal:  Negative for abdominal distention, abdominal pain, constipation, diarrhea, nausea and vomiting.     Past Medical History:   Diagnosis Date    Allergic     Anxiety 10/12/2017    Asthma Allergy asthma in fall    Chronic seasonal allergic rhinitis 10/12/2017    Depression 10/12/2017    GERD (gastroesophageal reflux disease)     History of COVID-19     Mixed hyperlipidemia 11/16/2017    Plantar fasciitis     Psychophysiological insomnia 10/12/2017    Vitamin D deficiency 10/12/2017     Past Surgical History:   Procedure Laterality Date    APPENDECTOMY      CHOLECYSTECTOMY      COLONOSCOPY N/A 2/14/2025    Procedure: COLONOSCOPY into cecum with hot snare polypectomies;  Surgeon: Frank Arroyo Jr., MD;  Location: [facility] ENDOSCOPY;  Service: General;  Laterality: N/A;  Screening  Post: Polyps and Diverticulosis    ENDOSCOPY      TONSILLECTOMY       Family History   Problem Relation Age of Onset    Hypertension Mother     Endometrial cancer Mother     Lung cancer Maternal Grandfather      Malig Hyperthermia Neg Hx      Social History     Socioeconomic History    Marital status:    Tobacco Use    Smoking status: Never     Passive exposure: Never    Smokeless tobacco: Never   Vaping Use    Vaping status: Never Used   Substance and Sexual Activity    Alcohol use: Yes     Alcohol/week: 2.0 standard drinks of alcohol     Types: 2 Drinks containing 0.5 oz of alcohol per week     Comment: OCCASIONAL    Drug use: Never    Sexual activity: Yes     Partners: Female     Birth control/protection: Condom       Objective   Physical Exam  Constitutional:       Appearance: He is not ill-appearing or toxic-appearing.   Abdominal:      Palpations: Abdomen is soft.      Tenderness: There is no abdominal tenderness.      Hernia: A hernia is present. Hernia is present in the left inguinal area. There is no hernia in the right inguinal area.      Comments: There was no palpable right inguinal hernia.  There was a moderately sized left inguinal hernia that is reducible.   Neurological:      Mental Status: He is alert.   Psychiatric:         Behavior: Behavior is cooperative.              Assessment & Plan     1.  Left inguinal hernia: The patient has a symptomatic left inguinal hernia that will intermittently have gurgling when reduced suggesting the presence of bowel.  This was discussed with him.  He had a previous CT scan of the abdomen and pelvis that showed bilateral fat-containing hernias which raises the possibility of a right inguinal hernia.  The finding on CT scan of the abdomen and pelvis may reflect a hernia or a cord lipoma.  Approaches to inguinal hernia repair were discussed with him.  He will be scheduled for da Ramiro robot assisted laparoscopic left inguinal hernia repair, possible bilateral inguinal hernia repairs.  The right groin will be carefully inspected intraoperatively to determine if it needs to be repaired.  The patient understands the indications, alternatives, risks, and benefits of the  procedure and wishes to proceed.

## (undated) DEVICE — ADAPT CLN BIOGUARD AIR/H2O DISP

## (undated) DEVICE — CANN O2 ETCO2 FITS ALL CONN CO2 SMPL A/ 7IN DISP LF

## (undated) DEVICE — TUBING, SUCTION, 1/4" X 10', STRAIGHT: Brand: MEDLINE

## (undated) DEVICE — KT ORCA ORCAPOD DISP STRL

## (undated) DEVICE — THE SINGLE USE ETRAP – POLYP TRAP IS USED FOR SUCTION RETRIEVAL OF ENDOSCOPICALLY REMOVED POLYPS.: Brand: ETRAP

## (undated) DEVICE — LN SMPL CO2 SHTRM SD STREAM W/M LUER

## (undated) DEVICE — ERBE NESSY®PLATE 170 SPLIT; 168CM²; CABLE 3M: Brand: ERBE

## (undated) DEVICE — SNAR POLYP SENSATION STDOVL 27 240 BX40

## (undated) DEVICE — SENSR O2 OXIMAX FNGR A/ 18IN NONSTR